# Patient Record
Sex: MALE | Race: WHITE | NOT HISPANIC OR LATINO | Employment: OTHER | URBAN - METROPOLITAN AREA
[De-identification: names, ages, dates, MRNs, and addresses within clinical notes are randomized per-mention and may not be internally consistent; named-entity substitution may affect disease eponyms.]

---

## 2017-01-11 DIAGNOSIS — E78.5 OTHER AND UNSPECIFIED HYPERLIPIDEMIA: Primary | ICD-10-CM

## 2017-01-11 RX ORDER — ATORVASTATIN CALCIUM 80 MG/1
80 TABLET, FILM COATED ORAL NIGHTLY
Qty: 90 TABLET | Refills: 3 | Status: SHIPPED | OUTPATIENT
Start: 2017-01-11 | End: 2017-02-07 | Stop reason: SDUPTHER

## 2017-01-11 NOTE — TELEPHONE ENCOUNTER
----- Message from Elle Denson sent at 1/11/2017  8:14 AM CST -----  Contact: pt  States he needs a refill on generic for lipator 80 mg, 90 day supply.  States it needs to go to [facility]. Please call pt at [phone]. Thank you

## 2017-02-07 DIAGNOSIS — I25.10 CORONARY ARTERY DISEASE, ANGINA PRESENCE UNSPECIFIED, UNSPECIFIED VESSEL OR LESION TYPE, UNSPECIFIED WHETHER NATIVE OR TRANSPLANTED HEART: ICD-10-CM

## 2017-02-07 DIAGNOSIS — E78.5 OTHER AND UNSPECIFIED HYPERLIPIDEMIA: ICD-10-CM

## 2017-02-07 DIAGNOSIS — I10 ESSENTIAL HYPERTENSION: ICD-10-CM

## 2017-02-07 RX ORDER — METOPROLOL TARTRATE 50 MG/1
50 TABLET ORAL 2 TIMES DAILY
Qty: 180 TABLET | Refills: 3 | Status: SHIPPED | OUTPATIENT
Start: 2017-02-07 | End: 2018-03-15 | Stop reason: SDUPTHER

## 2017-02-07 RX ORDER — ATORVASTATIN CALCIUM 80 MG/1
80 TABLET, FILM COATED ORAL NIGHTLY
Qty: 90 TABLET | Refills: 3 | Status: SHIPPED | OUTPATIENT
Start: 2017-02-07 | End: 2018-03-15 | Stop reason: SDUPTHER

## 2017-02-07 RX ORDER — CLOPIDOGREL BISULFATE 75 MG/1
75 TABLET ORAL DAILY
Qty: 90 TABLET | Refills: 3 | Status: SHIPPED | OUTPATIENT
Start: 2017-02-07 | End: 2018-03-15 | Stop reason: SDUPTHER

## 2017-02-07 RX ORDER — NIACIN 750 MG/1
750 TABLET, EXTENDED RELEASE ORAL 2 TIMES DAILY
Qty: 180 TABLET | Refills: 3 | Status: SHIPPED | OUTPATIENT
Start: 2017-02-07 | End: 2018-03-15 | Stop reason: SDUPTHER

## 2017-02-07 NOTE — TELEPHONE ENCOUNTER
----- Message from Earl Matos sent at 2/7/2017 10:26 AM CST -----  The pt is request that his rx be renewed through [facility] - [address] [address]  Phone: [phone] Fax: [phone]    atorvastatin (LIPITOR) 80 MG tablet  niacin (NIASPAN EXTENDED-RELEASE) 750 MG CR tablet (be sure to place rx to which the patient will receive the amount needed for 1500 MG)  clopidogrel (PLAVIX) 75 mg tablet

## 2017-07-11 ENCOUNTER — TELEPHONE (OUTPATIENT)
Dept: CARDIOLOGY | Facility: CLINIC | Age: 67
End: 2017-07-11

## 2017-07-11 NOTE — TELEPHONE ENCOUNTER
----- Message from Liberty Huntley sent at 7/11/2017  8:55 AM CDT -----  Contact: pt   States he's retn nurses call and can be reached at [phone] ext [phone]//thanks/[email or web address]

## 2017-07-12 NOTE — TELEPHONE ENCOUNTER
Spoke to patient's wife.  Informed her that we had called to get her scheduled for patient's 1 year follow up.  She stated patient will have to call office back after reviewing his work schedule.

## 2017-09-08 ENCOUNTER — OFFICE VISIT (OUTPATIENT)
Dept: CARDIOLOGY | Facility: CLINIC | Age: 67
End: 2017-09-08
Payer: COMMERCIAL

## 2017-09-08 VITALS
WEIGHT: 208.69 LBS | BODY MASS INDEX: 29.88 KG/M2 | HEART RATE: 73 BPM | DIASTOLIC BLOOD PRESSURE: 74 MMHG | HEIGHT: 70 IN | SYSTOLIC BLOOD PRESSURE: 120 MMHG

## 2017-09-08 DIAGNOSIS — I25.10 CORONARY ARTERY DISEASE INVOLVING NATIVE CORONARY ARTERY OF NATIVE HEART WITHOUT ANGINA PECTORIS: ICD-10-CM

## 2017-09-08 DIAGNOSIS — Z98.61 HISTORY OF PTCA: ICD-10-CM

## 2017-09-08 DIAGNOSIS — I25.2 OLD MI (MYOCARDIAL INFARCTION): ICD-10-CM

## 2017-09-08 DIAGNOSIS — I10 ESSENTIAL HYPERTENSION: Primary | ICD-10-CM

## 2017-09-08 DIAGNOSIS — E78.2 MIXED HYPERLIPIDEMIA: ICD-10-CM

## 2017-09-08 PROCEDURE — 3008F BODY MASS INDEX DOCD: CPT | Mod: S$GLB,,, | Performed by: INTERNAL MEDICINE

## 2017-09-08 PROCEDURE — 3078F DIAST BP <80 MM HG: CPT | Mod: S$GLB,,, | Performed by: INTERNAL MEDICINE

## 2017-09-08 PROCEDURE — 3074F SYST BP LT 130 MM HG: CPT | Mod: S$GLB,,, | Performed by: INTERNAL MEDICINE

## 2017-09-08 PROCEDURE — 99213 OFFICE O/P EST LOW 20 MIN: CPT | Mod: S$GLB,,, | Performed by: INTERNAL MEDICINE

## 2017-09-08 PROCEDURE — 93000 ELECTROCARDIOGRAM COMPLETE: CPT | Mod: S$GLB,,, | Performed by: INTERNAL MEDICINE

## 2017-09-08 PROCEDURE — 99999 PR PBB SHADOW E&M-EST. PATIENT-LVL III: CPT | Mod: PBBFAC,,, | Performed by: INTERNAL MEDICINE

## 2017-09-08 NOTE — PROGRESS NOTES
"Subjective:    Patient ID:  Harry Phan is a 66 y.o. male who presents for evaluation of Coronary Artery Disease (pt presents for a routine f/u) and Hypertension      HPI Mr. Phan returns for f/u.  His current medical conditions include CAD, dyslipidemia. Nonsmoker.   s/p NSTEMI 12/08 and was found to have a 100% chronic occluded mid-LCX artery and a 95% mid-RCA lesion (culprit vessel) and underwent successful PCI with a 4 x 23 mm Vision BMS. He is being medically managed for his occluded LCX. He also was noted to have nonobstructive LAD disease.   Now here for f/u.  Feels good. No anginal sxs.  ecg today is normal  BP controlled on current meds  Lipids stable on statin tx, PCP checking lipids.    Review of Systems   Constitution: Negative.   HENT: Negative.    Eyes: Negative.    Cardiovascular: Negative.    Respiratory: Negative.    Endocrine: Negative.    Hematologic/Lymphatic: Negative.    Skin: Negative.    Musculoskeletal: Negative.    Gastrointestinal: Negative.    Genitourinary: Negative.    Neurological: Negative.    Psychiatric/Behavioral: Negative.    Allergic/Immunologic: Negative.        /74   Pulse 73   Ht 5' 10" (1.778 m)   Wt 94.7 kg (208 lb 10.7 oz)   BMI 29.94 kg/m²     Wt Readings from Last 3 Encounters:   09/08/17 94.7 kg (208 lb 10.7 oz)   09/08/16 96.2 kg (212 lb 1.3 oz)   05/26/16 96.4 kg (212 lb 8.4 oz)     Temp Readings from Last 3 Encounters:   No data found for Temp     BP Readings from Last 3 Encounters:   09/08/17 120/74   09/08/16 134/78   05/26/16 (!) 142/94     Pulse Readings from Last 3 Encounters:   09/08/17 73   09/08/16 68   05/26/16 78          Objective:    Physical Exam   Constitutional: He is oriented to person, place, and time. He appears well-developed and well-nourished.   HENT:   Head: Normocephalic.   Neck: Normal range of motion. Neck supple. Normal carotid pulses, no hepatojugular reflux and no JVD present. Carotid bruit is not present. No thyromegaly " present.   Cardiovascular: Normal rate, regular rhythm, S1 normal and S2 normal.  PMI is not displaced.  Exam reveals no S3, no S4, no distant heart sounds, no friction rub, no midsystolic click and no opening snap.    No murmur heard.  Pulses:       Radial pulses are 2+ on the right side, and 2+ on the left side.   Pulmonary/Chest: Effort normal and breath sounds normal. He has no wheezes. He has no rales.   Abdominal: Soft. Bowel sounds are normal. He exhibits no distension, no abdominal bruit, no ascites and no mass. There is no tenderness.   Musculoskeletal: He exhibits no edema.   Neurological: He is alert and oriented to person, place, and time.   Skin: Skin is warm.   Psychiatric: He has a normal mood and affect. His behavior is normal.   Nursing note and vitals reviewed.    I have reviewed all pertinent labs and cardiac studies.      Chemistry        Component Value Date/Time     04/25/2016 0829    K 4.4 04/25/2016 0829     04/25/2016 0829    CO2 26 04/25/2016 0829    BUN 14 04/25/2016 0829    CREATININE 0.9 04/25/2016 0829    GLU 93 04/25/2016 0829        Component Value Date/Time    CALCIUM 9.2 04/25/2016 0829    ALKPHOS 68 04/25/2016 0829    AST 25 04/25/2016 0829    ALT 20 04/25/2016 0829    BILITOT 0.7 04/25/2016 0829    ESTGFRAFRICA >60.0 04/25/2016 0829    EGFRNONAA >60.0 04/25/2016 0829        Lab Results   Component Value Date    WBC 6.66 02/04/2010    HGB 14.4 02/04/2010    HCT 43.0 02/04/2010    MCV 89.0 02/04/2010     02/04/2010     Lab Results   Component Value Date    CHOL 117 (L) 04/25/2016    CHOL 119 (L) 04/02/2015    CHOL 121 04/11/2014     Lab Results   Component Value Date    HDL 33 (L) 04/25/2016    HDL 36 (L) 04/02/2015    HDL 34 (L) 04/11/2014     Lab Results   Component Value Date    LDLCALC 69.4 04/25/2016    LDLCALC 68.6 04/02/2015    LDLCALC 73.2 04/11/2014     Lab Results   Component Value Date    TRIG 73 04/25/2016    TRIG 72 04/02/2015    TRIG 69 04/11/2014      Lab Results   Component Value Date    CHOLHDL 28.2 04/25/2016    CHOLHDL 30.3 04/02/2015    CHOLHDL 28.1 04/11/2014           Assessment:       Stable CAD on current medical tx.    1. Essential hypertension    2. Old MI (myocardial infarction)    3. Mixed hyperlipidemia    4. History of PTCA    5. Coronary artery disease involving native coronary artery of native heart without angina pectoris         Plan:             Continue current medical tx.  Cardiac diet  Daily exercise  F/u 1 year with stress echo.

## 2018-03-15 DIAGNOSIS — I10 ESSENTIAL HYPERTENSION: ICD-10-CM

## 2018-03-15 DIAGNOSIS — E78.2 MIXED HYPERLIPIDEMIA: ICD-10-CM

## 2018-03-15 DIAGNOSIS — I25.10 CORONARY ARTERY DISEASE, ANGINA PRESENCE UNSPECIFIED, UNSPECIFIED VESSEL OR LESION TYPE, UNSPECIFIED WHETHER NATIVE OR TRANSPLANTED HEART: ICD-10-CM

## 2018-03-15 RX ORDER — CLOPIDOGREL BISULFATE 75 MG/1
75 TABLET ORAL DAILY
Qty: 90 TABLET | Refills: 3 | Status: ON HOLD | OUTPATIENT
Start: 2018-03-15 | End: 2018-08-03 | Stop reason: HOSPADM

## 2018-03-15 RX ORDER — METOPROLOL TARTRATE 50 MG/1
50 TABLET ORAL 2 TIMES DAILY
Qty: 180 TABLET | Refills: 3 | Status: SHIPPED | OUTPATIENT
Start: 2018-03-15 | End: 2019-01-01 | Stop reason: SDUPTHER

## 2018-03-15 RX ORDER — NIACIN 750 MG/1
750 TABLET, EXTENDED RELEASE ORAL 2 TIMES DAILY
Qty: 180 TABLET | Refills: 3 | Status: SHIPPED | OUTPATIENT
Start: 2018-03-15

## 2018-03-15 RX ORDER — ATORVASTATIN CALCIUM 80 MG/1
80 TABLET, FILM COATED ORAL NIGHTLY
Qty: 90 TABLET | Refills: 3 | Status: SHIPPED | OUTPATIENT
Start: 2018-03-15 | End: 2019-01-01 | Stop reason: SDUPTHER

## 2018-08-01 ENCOUNTER — CLINICAL SUPPORT (OUTPATIENT)
Dept: CARDIOLOGY | Facility: CLINIC | Age: 68
End: 2018-08-01
Payer: MEDICARE

## 2018-08-01 ENCOUNTER — OFFICE VISIT (OUTPATIENT)
Dept: CARDIOLOGY | Facility: CLINIC | Age: 68
End: 2018-08-01
Payer: MEDICARE

## 2018-08-01 VITALS
HEIGHT: 70 IN | HEART RATE: 68 BPM | DIASTOLIC BLOOD PRESSURE: 72 MMHG | SYSTOLIC BLOOD PRESSURE: 140 MMHG | BODY MASS INDEX: 30.87 KG/M2 | WEIGHT: 215.63 LBS

## 2018-08-01 DIAGNOSIS — I25.10 CAD (CORONARY ARTERY DISEASE): Primary | ICD-10-CM

## 2018-08-01 DIAGNOSIS — I20.9 AP (ANGINA PECTORIS): ICD-10-CM

## 2018-08-01 DIAGNOSIS — I10 ESSENTIAL HYPERTENSION: ICD-10-CM

## 2018-08-01 DIAGNOSIS — I25.10 CORONARY ARTERY DISEASE INVOLVING NATIVE CORONARY ARTERY OF NATIVE HEART WITHOUT ANGINA PECTORIS: ICD-10-CM

## 2018-08-01 DIAGNOSIS — I20.0 ANGINA PECTORIS, UNSTABLE: Primary | ICD-10-CM

## 2018-08-01 DIAGNOSIS — Z98.61 HISTORY OF PTCA: ICD-10-CM

## 2018-08-01 DIAGNOSIS — I25.2 OLD MI (MYOCARDIAL INFARCTION): ICD-10-CM

## 2018-08-01 DIAGNOSIS — E78.2 MIXED HYPERLIPIDEMIA: ICD-10-CM

## 2018-08-01 PROCEDURE — 93000 ELECTROCARDIOGRAM COMPLETE: CPT | Mod: S$GLB,,, | Performed by: INTERNAL MEDICINE

## 2018-08-01 PROCEDURE — 3077F SYST BP >= 140 MM HG: CPT | Mod: CPTII,S$GLB,, | Performed by: INTERNAL MEDICINE

## 2018-08-01 PROCEDURE — 99215 OFFICE O/P EST HI 40 MIN: CPT | Mod: S$GLB,,, | Performed by: INTERNAL MEDICINE

## 2018-08-01 PROCEDURE — 99999 PR PBB SHADOW E&M-EST. PATIENT-LVL III: CPT | Mod: PBBFAC,,, | Performed by: INTERNAL MEDICINE

## 2018-08-01 PROCEDURE — 3008F BODY MASS INDEX DOCD: CPT | Mod: CPTII,S$GLB,, | Performed by: INTERNAL MEDICINE

## 2018-08-01 PROCEDURE — 3078F DIAST BP <80 MM HG: CPT | Mod: CPTII,S$GLB,, | Performed by: INTERNAL MEDICINE

## 2018-08-01 RX ORDER — ISOSORBIDE MONONITRATE 30 MG/1
30 TABLET, EXTENDED RELEASE ORAL DAILY
Qty: 30 TABLET | Refills: 11 | Status: SHIPPED | OUTPATIENT
Start: 2018-08-01 | End: 2018-09-05 | Stop reason: SDUPTHER

## 2018-08-01 RX ORDER — RAMIPRIL 1.25 MG/1
CAPSULE ORAL DAILY
COMMUNITY
Start: 2018-07-02 | End: 2018-11-09 | Stop reason: ALTCHOICE

## 2018-08-01 RX ORDER — NITROGLYCERIN 0.4 MG/1
0.4 TABLET SUBLINGUAL EVERY 5 MIN PRN
Qty: 20 TABLET | Refills: 12 | Status: SHIPPED | OUTPATIENT
Start: 2018-08-01 | End: 2020-02-19 | Stop reason: SDUPTHER

## 2018-08-01 NOTE — PROGRESS NOTES
Subjective:    Patient ID:  Harry Phan is a 67 y.o. male who presents for evaluation of Chest Pain; Coronary Artery Disease; Hyperlipidemia; and Hypertension      HPI Mr. Phan returns for f/u.  His current medical conditions include CAD, dyslipidemia. Nonsmoker.   s/p NSTEMI 12/08 and was found to have a 100% chronic occluded mid-LCX artery and a 95% mid-RCA lesion (culprit vessel) and underwent successful PCI with a 4 x 23 mm Vision BMS. He is being medically managed for his occluded LCX. He also was noted to have nonobstructive LAD disease.   Now here for f/u.  A week ago, while outside picking up limbs had chest pressure type sensation upper chest.  Had few episodes with exertion, lasted 2 - 3 minutes and resolve.  No associated sxs.  sxs occurred outside.  Last episode was few days ago shoveling dirt.  No cp with ordinary walking.   He did have episode with light running.  sxs resolve quickly with rest.  No rest pain.  Had some belching, indigestion sometimes in past, few weeks ago.  ecg today is normal.          Current Outpatient Prescriptions:     aspirin (ECOTRIN) 81 MG EC tablet, Take 81 mg by mouth once daily., Disp: , Rfl:     atorvastatin (LIPITOR) 80 MG tablet, Take 1 tablet (80 mg total) by mouth every evening., Disp: 90 tablet, Rfl: 3    clopidogrel (PLAVIX) 75 mg tablet, Take 1 tablet (75 mg total) by mouth once daily., Disp: 90 tablet, Rfl: 3    metoprolol tartrate (LOPRESSOR) 50 MG tablet, Take 1 tablet (50 mg total) by mouth 2 (two) times daily., Disp: 180 tablet, Rfl: 3    niacin (NIASPAN EXTENDED-RELEASE) 750 MG CR tablet, Take 1 tablet (750 mg total) by mouth 2 (two) times daily., Disp: 180 tablet, Rfl: 3    ramipril (ALTACE) 1.25 MG capsule, , Disp: , Rfl:     isosorbide mononitrate (IMDUR) 30 MG 24 hr tablet, Take 1 tablet (30 mg total) by mouth once daily., Disp: 30 tablet, Rfl: 11    nitroGLYCERIN (NITROSTAT) 0.4 MG SL tablet, Place 1 tablet (0.4 mg total) under the tongue  "every 5 (five) minutes as needed., Disp: 20 tablet, Rfl: 12      Review of Systems   Constitution: Negative.   HENT: Negative.    Eyes: Negative.    Cardiovascular: Positive for chest pain.   Respiratory: Negative.    Endocrine: Negative.    Hematologic/Lymphatic: Negative.    Skin: Negative.    Musculoskeletal: Negative.    Gastrointestinal: Positive for heartburn.   Genitourinary: Negative.    Neurological: Negative.    Psychiatric/Behavioral: Negative.    Allergic/Immunologic: Negative.        BP (!) 140/72 (BP Location: Right arm, Patient Position: Sitting, BP Method: Medium (Manual))   Pulse 68   Ht 5' 10" (1.778 m)   Wt 97.8 kg (215 lb 9.8 oz)   BMI 30.94 kg/m²     Wt Readings from Last 3 Encounters:   08/01/18 97.8 kg (215 lb 9.8 oz)   09/08/17 94.7 kg (208 lb 10.7 oz)   09/08/16 96.2 kg (212 lb 1.3 oz)     Temp Readings from Last 3 Encounters:   No data found for Temp     BP Readings from Last 3 Encounters:   08/01/18 (!) 140/72   09/08/17 120/74   09/08/16 134/78     Pulse Readings from Last 3 Encounters:   08/01/18 68   09/08/17 73   09/08/16 68          Objective:    Physical Exam   Constitutional: He is oriented to person, place, and time. He appears well-developed and well-nourished.   HENT:   Head: Normocephalic.   Neck: Normal range of motion. Neck supple. Normal carotid pulses, no hepatojugular reflux and no JVD present. Carotid bruit is not present. No thyromegaly present.   Cardiovascular: Normal rate, regular rhythm, S1 normal and S2 normal.  PMI is not displaced.  Exam reveals no S3, no S4, no distant heart sounds, no friction rub, no midsystolic click and no opening snap.    No murmur heard.  Pulses:       Radial pulses are 2+ on the right side, and 2+ on the left side.   Pulmonary/Chest: Effort normal and breath sounds normal. He has no wheezes. He has no rales.   Abdominal: Soft. Bowel sounds are normal. He exhibits no distension, no abdominal bruit, no ascites and no mass. There is no " tenderness.   Musculoskeletal: He exhibits no edema.   Neurological: He is alert and oriented to person, place, and time.   Skin: Skin is warm.   Psychiatric: He has a normal mood and affect. His behavior is normal.   Nursing note and vitals reviewed.      I have reviewed all pertinent labs and cardiac studies.          Assessment:       1. Angina pectoris, unstable    2. Essential hypertension    3. Old MI (myocardial infarction)    4. Mixed hyperlipidemia    5. History of PTCA    6. Coronary artery disease involving native coronary artery of native heart without angina pectoris    7. AP (angina pectoris)         Plan:             Exertional angina, classic sxs.  New onset -- has not had angina since his PCI for NSTEMI 2008.  Dx with unstable angina.  Imdur 30 mg qd.  Sl ntg prn. Pt advised how to use and when to go to ER.   Continue other meds.  Long discussion about stress testing vs LHC, pros/cons of each approach.  Recommend LHC in light of new onset angina to reassess patency of his RCA stent,  LCX and look for progression of disease.  Pt and wife agreeable to proceeding.  Will schedule asap in am.  To ER if any sxs worsen in meantime.    Pt advised to undergo left heart catheterization with possible PCI if warranted.  Pt advised of all risks and benefits of procedure.  Pt advised of alternative approaches and treatment strategies to include medical therapy, PCI as well as surgical revascularization with possible CABG.  All questions answered in clinic.     Right radial approach.

## 2018-08-02 ENCOUNTER — SURGERY (OUTPATIENT)
Age: 68
End: 2018-08-02

## 2018-08-02 ENCOUNTER — HOSPITAL ENCOUNTER (OUTPATIENT)
Facility: HOSPITAL | Age: 68
Discharge: HOME | End: 2018-08-03
Attending: INTERNAL MEDICINE | Admitting: INTERNAL MEDICINE
Payer: MEDICARE

## 2018-08-02 DIAGNOSIS — I25.10 ATHEROSCLEROTIC HEART DISEASE OF NATIVE CORONARY ARTERY WITHOUT ANGINA PECTORIS: ICD-10-CM

## 2018-08-02 DIAGNOSIS — I20.0 UNSTABLE ANGINA: ICD-10-CM

## 2018-08-02 DIAGNOSIS — Z95.5 STATUS POST INSERTION OF DRUG-ELUTING STENT INTO LEFT ANTERIOR DESCENDING (LAD) ARTERY: Primary | ICD-10-CM

## 2018-08-02 LAB
ANION GAP SERPL CALC-SCNC: 7 MMOL/L
APTT BLDCRRT: 25.2 SEC
BUN SERPL-MCNC: 15 MG/DL
CALCIUM SERPL-MCNC: 9.6 MG/DL
CHLORIDE SERPL-SCNC: 109 MMOL/L
CO2 SERPL-SCNC: 25 MMOL/L
CORONARY STENOSIS: ABNORMAL
CORONARY STENT: YES
CREAT SERPL-MCNC: 1 MG/DL
ERYTHROCYTE [DISTWIDTH] IN BLOOD BY AUTOMATED COUNT: 13.8 %
EST. GFR  (AFRICAN AMERICAN): >60 ML/MIN/1.73 M^2
EST. GFR  (NON AFRICAN AMERICAN): >60 ML/MIN/1.73 M^2
GLUCOSE SERPL-MCNC: 102 MG/DL
HCT VFR BLD AUTO: 47 %
HGB BLD-MCNC: 16.2 G/DL
INR PPP: 1.1
MCH RBC QN AUTO: 31.4 PG
MCHC RBC AUTO-ENTMCNC: 34.5 G/DL
MCV RBC AUTO: 91 FL
PLATELET # BLD AUTO: 129 K/UL
PMV BLD AUTO: 10.2 FL
POTASSIUM SERPL-SCNC: 4.1 MMOL/L
PROTHROMBIN TIME: 11.2 SEC
RBC # BLD AUTO: 5.16 M/UL
SODIUM SERPL-SCNC: 141 MMOL/L
WBC # BLD AUTO: 7.17 K/UL

## 2018-08-02 PROCEDURE — 93458 L HRT ARTERY/VENTRICLE ANGIO: CPT | Mod: 59

## 2018-08-02 PROCEDURE — 85610 PROTHROMBIN TIME: CPT

## 2018-08-02 PROCEDURE — 93010 ELECTROCARDIOGRAM REPORT: CPT | Mod: 59,,, | Performed by: INTERNAL MEDICINE

## 2018-08-02 PROCEDURE — 80048 BASIC METABOLIC PNL TOTAL CA: CPT

## 2018-08-02 PROCEDURE — 93005 ELECTROCARDIOGRAM TRACING: CPT

## 2018-08-02 PROCEDURE — 85027 COMPLETE CBC AUTOMATED: CPT

## 2018-08-02 PROCEDURE — 85730 THROMBOPLASTIN TIME PARTIAL: CPT

## 2018-08-02 PROCEDURE — C9600 PERC DRUG-EL COR STENT SING: HCPCS | Mod: LD

## 2018-08-02 PROCEDURE — 25000003 PHARM REV CODE 250

## 2018-08-02 PROCEDURE — 63600175 PHARM REV CODE 636 W HCPCS

## 2018-08-02 PROCEDURE — 25000003 PHARM REV CODE 250: Performed by: INTERNAL MEDICINE

## 2018-08-02 PROCEDURE — 99152 MOD SED SAME PHYS/QHP 5/>YRS: CPT | Mod: ,,, | Performed by: INTERNAL MEDICINE

## 2018-08-02 PROCEDURE — 92928 PRQ TCAT PLMT NTRAC ST 1 LES: CPT | Mod: LD,,, | Performed by: INTERNAL MEDICINE

## 2018-08-02 PROCEDURE — 93458 L HRT ARTERY/VENTRICLE ANGIO: CPT | Mod: 26,59,, | Performed by: INTERNAL MEDICINE

## 2018-08-02 RX ORDER — NITROGLYCERIN 0.4 MG/1
0.4 TABLET SUBLINGUAL EVERY 5 MIN PRN
Status: DISCONTINUED | OUTPATIENT
Start: 2018-08-02 | End: 2018-08-03 | Stop reason: HOSPADM

## 2018-08-02 RX ORDER — SODIUM CHLORIDE 9 MG/ML
INJECTION, SOLUTION INTRAVENOUS CONTINUOUS
Status: DISCONTINUED | OUTPATIENT
Start: 2018-08-02 | End: 2018-08-03 | Stop reason: HOSPADM

## 2018-08-02 RX ORDER — METOPROLOL TARTRATE 50 MG/1
50 TABLET ORAL 2 TIMES DAILY
Status: DISCONTINUED | OUTPATIENT
Start: 2018-08-02 | End: 2018-08-03 | Stop reason: HOSPADM

## 2018-08-02 RX ORDER — ZOLPIDEM TARTRATE 5 MG/1
5 TABLET ORAL NIGHTLY PRN
Status: DISCONTINUED | OUTPATIENT
Start: 2018-08-02 | End: 2018-08-03 | Stop reason: HOSPADM

## 2018-08-02 RX ORDER — HYDRALAZINE HYDROCHLORIDE 20 MG/ML
10 INJECTION INTRAMUSCULAR; INTRAVENOUS EVERY 4 HOURS PRN
Status: DISCONTINUED | OUTPATIENT
Start: 2018-08-02 | End: 2018-08-02

## 2018-08-02 RX ORDER — ATORVASTATIN CALCIUM 40 MG/1
80 TABLET, FILM COATED ORAL NIGHTLY
Status: DISCONTINUED | OUTPATIENT
Start: 2018-08-02 | End: 2018-08-03 | Stop reason: HOSPADM

## 2018-08-02 RX ORDER — DIPHENHYDRAMINE HCL 50 MG
50 CAPSULE ORAL ONCE
Status: COMPLETED | OUTPATIENT
Start: 2018-08-02 | End: 2018-08-02

## 2018-08-02 RX ORDER — ASPIRIN 325 MG
325 TABLET ORAL ONCE
Status: COMPLETED | OUTPATIENT
Start: 2018-08-02 | End: 2018-08-02

## 2018-08-02 RX ORDER — ONDANSETRON 8 MG/1
8 TABLET, ORALLY DISINTEGRATING ORAL EVERY 8 HOURS PRN
Status: DISCONTINUED | OUTPATIENT
Start: 2018-08-02 | End: 2018-08-03 | Stop reason: HOSPADM

## 2018-08-02 RX ORDER — DIAZEPAM 5 MG/1
5 TABLET ORAL
Status: COMPLETED | OUTPATIENT
Start: 2018-08-02 | End: 2018-08-02

## 2018-08-02 RX ORDER — RAMIPRIL 1.25 MG/1
1.25 CAPSULE ORAL DAILY
Status: DISCONTINUED | OUTPATIENT
Start: 2018-08-03 | End: 2018-08-03 | Stop reason: HOSPADM

## 2018-08-02 RX ORDER — HYDROCODONE BITARTRATE AND ACETAMINOPHEN 5; 325 MG/1; MG/1
1 TABLET ORAL EVERY 4 HOURS PRN
Status: DISCONTINUED | OUTPATIENT
Start: 2018-08-02 | End: 2018-08-03 | Stop reason: HOSPADM

## 2018-08-02 RX ORDER — PRASUGREL 10 MG/1
10 TABLET, FILM COATED ORAL DAILY
Status: DISCONTINUED | OUTPATIENT
Start: 2018-08-03 | End: 2018-08-03 | Stop reason: HOSPADM

## 2018-08-02 RX ORDER — SODIUM CHLORIDE 9 MG/ML
INJECTION, SOLUTION INTRAVENOUS CONTINUOUS
Status: DISCONTINUED | OUTPATIENT
Start: 2018-08-02 | End: 2018-08-02

## 2018-08-02 RX ORDER — MORPHINE SULFATE 10 MG/ML
2 INJECTION INTRAMUSCULAR; INTRAVENOUS; SUBCUTANEOUS EVERY 4 HOURS PRN
Status: DISCONTINUED | OUTPATIENT
Start: 2018-08-02 | End: 2018-08-03 | Stop reason: HOSPADM

## 2018-08-02 RX ORDER — ASPIRIN 81 MG/1
81 TABLET ORAL DAILY
Status: DISCONTINUED | OUTPATIENT
Start: 2018-08-03 | End: 2018-08-03 | Stop reason: HOSPADM

## 2018-08-02 RX ADMIN — DIAZEPAM 5 MG: 5 TABLET ORAL at 07:08

## 2018-08-02 RX ADMIN — Medication 50 MG: at 07:08

## 2018-08-02 RX ADMIN — Medication 325 MG: at 07:08

## 2018-08-02 RX ADMIN — ATORVASTATIN CALCIUM 80 MG: 40 TABLET, FILM COATED ORAL at 08:08

## 2018-08-02 RX ADMIN — SODIUM CHLORIDE: 0.9 INJECTION, SOLUTION INTRAVENOUS at 01:08

## 2018-08-02 RX ADMIN — METOPROLOL TARTRATE 50 MG: 50 TABLET ORAL at 08:08

## 2018-08-02 RX ADMIN — SODIUM CHLORIDE: 0.9 INJECTION, SOLUTION INTRAVENOUS at 06:08

## 2018-08-02 RX ADMIN — SODIUM CHLORIDE: 9 INJECTION, SOLUTION INTRAVENOUS at 07:08

## 2018-08-02 NOTE — PLAN OF CARE
CM met with patient and wife at bedside to assess discharge needs. Patient lives at home with wife and is independent with needs. Patient ambulates safely independently and denies any recent falls. No dc needs identified at this time. CM assessment and MOON completed, placed in chart, and copy given to patient.        08/02/18 1610   Discharge Assessment   Assessment Type Discharge Planning Assessment   Confirmed/corrected address and phone number on facesheet? Yes   Assessment information obtained from? Patient;Caregiver   Prior to hospitilization cognitive status: Alert/Oriented   Prior to hospitalization functional status: Independent   Current cognitive status: Alert/Oriented   Current Functional Status: Independent   Lives With spouse   Able to Return to Prior Arrangements yes   Is patient able to care for self after discharge? Yes   Patient's perception of discharge disposition home or selfcare   Readmission Within The Last 30 Days no previous admission in last 30 days   Patient currently being followed by outpatient case management? No   Patient currently receives any other outside agency services? No   Equipment Currently Used at Home none   Do you have any problems affording any of your prescribed medications? No   Is the patient taking medications as prescribed? yes   Does the patient have transportation home? Yes   Transportation Available family or friend will provide   Does the patient receive services at the [facility]? No   Discharge Plan A Home   Patient/Family In Agreement With Plan yes

## 2018-08-02 NOTE — OP NOTE
[facility] -   Cardiac Catheterization  Procedure Note    SUMMARY     Harry Phan  [id number]  Jesse Watkins MD    Date of Procedure: 8/2/2018    Procedure:  1.  LHC 2. LEFT VENTRICULOGRAM  3. CORONARY ANGIOGRAM  4.  PCI 95% PROX LAD MELE X ONE    Provider: Hernandez Sen MD    Assisting Provider:  Raul Izaguirre    Indications: He was referred for cardiac catheterization to further evaluate unstable angina.    Pre-Procedure Diagnosis:  Unstable angina    Post-Procedure Diagnosis:  PCI prox LAD MELE x one    Anesthesia: RN IV Sedation    Description of the Findings of the Procedure:     The risks, benefits, complications, treatment options, and expected outcomes were discussed with the patient. The patient and/or family concurred with the proposed plan, giving informed consent. Patient was brought to the cath lab after IV hydration was begun and oral premedication was given.    Findings:    PCI 95% prox LAD Stent Resolute MELE x one   LCX  Patent RCA stent  Normal EF  Right radial TR band  See report    Complications: None; patient tolerated the procedure well.    Estimated Blood Loss (EBL): Minimal           Implants: MELE x one    Specimens: none    Condition: stable    Disposition: PACU - hemodynamically stable.    Attestation: I was present and scrubbed for the entire procedure.     Recommendations:    Usual post PCI care  Asa  effient  Maximize medical tx for CAD  Cardiac diet  Admit overnight for observation

## 2018-08-02 NOTE — CARE UPDATE
Pt admitted to floor. Alert and oriented x 3. Cooperative with care. Denies chest pain. Heart with rrr. Pulses palpable. Rt wrist puncture site wnl. No swelling. No signs of hematoma. Wrist brace in place. Lungs wnl. Iv site wnl. Oriented to room and call light. No distress noted. Wife at bedside.

## 2018-08-02 NOTE — NURSING TRANSFER
Nursing Transfer Note      8/2/2018     [id number]    Transfer via W/C    Transfer with BELONGINGS AND PORT MONITOR    Transported by JANIYA NUÑEZ RN    Medicines sent: NONE    Chart send with patient: YES    Notified: WIFE AT BEDSIDE     Patient reassessed at: 8/2/18   1245  BEDSIDE REPORT TO DENISE    Upon arrival to floor: TRANSFERRED TO ROOM. TRANSFERRED TO BED.  TELEMETRY MONITER ON.  IV FLUIDS ON PUMP AT PRESCRIBED RATE.  PROCEDURAL ACCESS SITE WITHOUT BLEEDING OR HEMATOMA.  DRESSING DRY AND INTACT.  CARE HANDED OFF TO...DENISE.......

## 2018-08-02 NOTE — PLAN OF CARE
Problem: Patient Care Overview  Goal: Plan of Care Review  Outcome: Ongoing (interventions implemented as appropriate)  Dx chest pain( heart cath)  poc instructed on dbc 10 x q1h wa. Instructed on turning q2h. No signs  Of hematoma. Site wnl. Area soft to touch. Pulses palpable. Oriented to room and call light. Water, phone and call light in reach. Denies pain. No falls. Wife at bedside.

## 2018-08-02 NOTE — INTERVAL H&P NOTE
The patient has been examined and the H&P has been reviewed:    I concur with the findings and no changes have occurred since H&P was written.    Anesthesia/Surgery risks, benefits and alternative options discussed and understood by patient/family.          Active Hospital Problems    Diagnosis  POA    Unstable angina [I20.0]  Yes     Priority: High      Resolved Hospital Problems    Diagnosis Date Resolved POA   No resolved problems to display.

## 2018-08-02 NOTE — PLAN OF CARE
08/02/18 1629   MUHAMMAD Message   Medicare Outpatient and Observation Notification regarding financial responsibility Given to patient/caregiver;Explained to patient/caregiver;Signed/date by patient/caregiver   Date MUHAMMAD was signed 08/02/18   Time MUHAMMAD was signed 1612

## 2018-08-03 VITALS
WEIGHT: 215.38 LBS | BODY MASS INDEX: 30.83 KG/M2 | HEIGHT: 70 IN | SYSTOLIC BLOOD PRESSURE: 137 MMHG | HEART RATE: 67 BPM | TEMPERATURE: 97 F | DIASTOLIC BLOOD PRESSURE: 77 MMHG | OXYGEN SATURATION: 96 % | RESPIRATION RATE: 20 BRPM

## 2018-08-03 PROBLEM — Z95.5 STATUS POST INSERTION OF DRUG-ELUTING STENT INTO LEFT ANTERIOR DESCENDING (LAD) ARTERY: Status: ACTIVE | Noted: 2018-08-03

## 2018-08-03 LAB
ALBUMIN SERPL BCP-MCNC: 3.2 G/DL
ALP SERPL-CCNC: 64 U/L
ALT SERPL W/O P-5'-P-CCNC: 19 U/L
ANION GAP SERPL CALC-SCNC: 4 MMOL/L
AST SERPL-CCNC: 22 U/L
BASOPHILS # BLD AUTO: 0.01 K/UL
BASOPHILS NFR BLD: 0.1 %
BILIRUB SERPL-MCNC: 0.9 MG/DL
BUN SERPL-MCNC: 13 MG/DL
CALCIUM SERPL-MCNC: 8.9 MG/DL
CHLORIDE SERPL-SCNC: 109 MMOL/L
CO2 SERPL-SCNC: 26 MMOL/L
CREAT SERPL-MCNC: 0.8 MG/DL
DIFFERENTIAL METHOD: ABNORMAL
EOSINOPHIL # BLD AUTO: 0.1 K/UL
EOSINOPHIL NFR BLD: 1.7 %
ERYTHROCYTE [DISTWIDTH] IN BLOOD BY AUTOMATED COUNT: 13.9 %
EST. GFR  (AFRICAN AMERICAN): >60 ML/MIN/1.73 M^2
EST. GFR  (NON AFRICAN AMERICAN): >60 ML/MIN/1.73 M^2
GLUCOSE SERPL-MCNC: 92 MG/DL
HCT VFR BLD AUTO: 40.8 %
HGB BLD-MCNC: 13.6 G/DL
LYMPHOCYTES # BLD AUTO: 1.4 K/UL
LYMPHOCYTES NFR BLD: 19.7 %
MCH RBC QN AUTO: 30.6 PG
MCHC RBC AUTO-ENTMCNC: 33.3 G/DL
MCV RBC AUTO: 92 FL
MONOCYTES # BLD AUTO: 0.6 K/UL
MONOCYTES NFR BLD: 9.2 %
NEUTROPHILS # BLD AUTO: 4.8 K/UL
NEUTROPHILS NFR BLD: 69.3 %
PLATELET # BLD AUTO: 107 K/UL
PMV BLD AUTO: 10.2 FL
POTASSIUM SERPL-SCNC: 4.1 MMOL/L
PROT SERPL-MCNC: 5.5 G/DL
RBC # BLD AUTO: 4.45 M/UL
SODIUM SERPL-SCNC: 139 MMOL/L
WBC # BLD AUTO: 6.94 K/UL

## 2018-08-03 PROCEDURE — 99900031 HC PATIENT EDUCATION (STAT)

## 2018-08-03 PROCEDURE — 85025 COMPLETE CBC W/AUTO DIFF WBC: CPT

## 2018-08-03 PROCEDURE — 36415 COLL VENOUS BLD VENIPUNCTURE: CPT

## 2018-08-03 PROCEDURE — 93005 ELECTROCARDIOGRAM TRACING: CPT

## 2018-08-03 PROCEDURE — 25000003 PHARM REV CODE 250: Performed by: INTERNAL MEDICINE

## 2018-08-03 PROCEDURE — 93010 ELECTROCARDIOGRAM REPORT: CPT | Mod: ,,, | Performed by: INTERNAL MEDICINE

## 2018-08-03 PROCEDURE — 80053 COMPREHEN METABOLIC PANEL: CPT

## 2018-08-03 RX ORDER — PRASUGREL 10 MG/1
10 TABLET, FILM COATED ORAL DAILY
Qty: 30 TABLET | Refills: 11 | Status: SHIPPED | OUTPATIENT
Start: 2018-08-04 | End: 2018-08-09 | Stop reason: SDUPTHER

## 2018-08-03 RX ADMIN — SODIUM CHLORIDE: 0.9 INJECTION, SOLUTION INTRAVENOUS at 07:08

## 2018-08-03 RX ADMIN — PRASUGREL 10 MG: 10 TABLET, FILM COATED ORAL at 08:08

## 2018-08-03 RX ADMIN — METOPROLOL TARTRATE 50 MG: 50 TABLET ORAL at 08:08

## 2018-08-03 RX ADMIN — RAMIPRIL 1.25 MG: 1.25 CAPSULE ORAL at 08:08

## 2018-08-03 RX ADMIN — ASPIRIN 81 MG: 81 TABLET, COATED ORAL at 08:08

## 2018-08-03 NOTE — PLAN OF CARE
Problem: Patient Care Overview  Goal: Plan of Care Review  Outcome: Ongoing (interventions implemented as appropriate)  Pt alert and oriented. POC reviewed. IV fluids administered per orders. Dressing to right wrist CDI. Immobilizer in place. Pt remained free of falls during shift. Bed alarm set , call light in reach, room free of clutter, side rails  up x2, pt on telemetry monitor, will continue to monitor.

## 2018-08-03 NOTE — DISCHARGE SUMMARY
Discharge Note  Short Stay      SUMMARY     Admit Date: 8/2/2018    Attending Physician: Hernandez Sen MD     Discharge Physician: [facility]     Discharge Date: 8/3/18     Final Diagnosis: CAD     Outcome of Stay:  Mr. Phan presented for OP cath to further evaluate unstable angina symptoms. Cath showed 95% prox LAD lesion with successful PCI using MELE x1. Also noted to have  LCX with collateral from RCA and patent RCA stent. LVEF normal . Had uneventful night. Denies any chest pain or angina equivalent. No symptoms with ambulation. Tolerating meds well. Right radial access site without redness, tenderness, swelling, or drainage. There is no active external bleeding or hematoma. Right Radial and brachial pulses 2+. Skin warm/dry/pink with normal capillary refill. Has been examined and is clear for discharge. Has been counseled on post angiogram restrictions and verbalized an understanding. He will follow up in clinic next week. Clinic will call to arrange appt.     Disposition: Home or Self Care    Patient Instructions:   Current Discharge Medication List      START taking these medications    Details   prasugrel (EFFIENT) 10 mg Tab Take 1 tablet (10 mg total) by mouth once daily.  Qty: 30 tablet, Refills: 11         CONTINUE these medications which have NOT CHANGED    Details   aspirin (ECOTRIN) 81 MG EC tablet Take 81 mg by mouth once daily.      atorvastatin (LIPITOR) 80 MG tablet Take 1 tablet (80 mg total) by mouth every evening.  Qty: 90 tablet, Refills: 3    Associated Diagnoses: Mixed hyperlipidemia; Coronary artery disease, angina presence unspecified, unspecified vessel or lesion type, unspecified whether native or transplanted heart      isosorbide mononitrate (IMDUR) 30 MG 24 hr tablet Take 1 tablet (30 mg total) by mouth once daily.  Qty: 30 tablet, Refills: 11    Associated Diagnoses: AP (angina pectoris)      metoprolol tartrate (LOPRESSOR) 50 MG tablet Take 1 tablet (50 mg  total) by mouth 2 (two) times daily.  Qty: 180 tablet, Refills: 3    Associated Diagnoses: Essential hypertension; Coronary artery disease, angina presence unspecified, unspecified vessel or lesion type, unspecified whether native or transplanted heart      niacin (NIASPAN EXTENDED-RELEASE) 750 MG CR tablet Take 1 tablet (750 mg total) by mouth 2 (two) times daily.  Qty: 180 tablet, Refills: 3    Associated Diagnoses: Coronary artery disease, angina presence unspecified, unspecified vessel or lesion type, unspecified whether native or transplanted heart      ramipril (ALTACE) 1.25 MG capsule once daily.       nitroGLYCERIN (NITROSTAT) 0.4 MG SL tablet Place 1 tablet (0.4 mg total) under the tongue every 5 (five) minutes as needed.  Qty: 20 tablet, Refills: 12    Associated Diagnoses: AP (angina pectoris)         STOP taking these medications       clopidogrel (PLAVIX) 75 mg tablet Comments:   Reason for Stopping:               Discharge Procedure Orders (must include Diet, Follow-up, Activity)    Discharge Procedure Orders (must include Diet, Follow-up, Activity)  Diet Cardiac     Lifting restrictions   Order Comments: No lifting greater than 10lbs x 1 week     Notify your health care provider if you experience any of the following:  temperature >100.4     Notify your health care provider if you experience any of the following:  persistent nausea and vomiting or diarrhea     Notify your health care provider if you experience any of the following:  severe uncontrolled pain     Notify your health care provider if you experience any of the following:  redness, tenderness, or signs of infection (pain, swelling, redness, odor or green/yellow discharge around incision site)     Notify your health care provider if you experience any of the following:  difficulty breathing or increased cough     Notify your health care provider if you experience any of the following:  severe persistent headache     Notify your health care  provider if you experience any of the following:  worsening rash     Notify your health care provider if you experience any of the following:  persistent dizziness, light-headedness, or visual disturbances     Notify your health care provider if you experience any of the following:  increased confusion or weakness     Remove dressing in 24 hours     Activity as tolerated     Shower on day dressing removed (No bath)       Follow-up Information     [facility] In 1 week.    Specialty:  Cardiology  Why:  Hospital follow up. Clinic will call to arrange both appts   Contact information:  [address] [address] [phone]             Hernandez Sen MD In 1 month.    Specialties:  Cardiology, INTERVENTIONAL CARDIOLOGY  Contact information:  [address] [address] [phone]

## 2018-08-03 NOTE — PROGRESS NOTES
Went over discharge instructions with patient.   Stressed importance of making and keeping all follow ups.   Patient verbalized understanding and has no questions in regards to discharge.  IV removed, catheter intact.  Telemetry box removed.  Patient taken to front of hospital by wheelchair.

## 2018-08-09 ENCOUNTER — OFFICE VISIT (OUTPATIENT)
Dept: CARDIOLOGY | Facility: CLINIC | Age: 68
End: 2018-08-09
Payer: MEDICARE

## 2018-08-09 VITALS
HEIGHT: 70 IN | HEART RATE: 64 BPM | BODY MASS INDEX: 30.87 KG/M2 | SYSTOLIC BLOOD PRESSURE: 114 MMHG | WEIGHT: 215.63 LBS | DIASTOLIC BLOOD PRESSURE: 76 MMHG

## 2018-08-09 DIAGNOSIS — Z95.5 STATUS POST INSERTION OF DRUG-ELUTING STENT INTO LEFT ANTERIOR DESCENDING (LAD) ARTERY: Primary | ICD-10-CM

## 2018-08-09 DIAGNOSIS — Z98.61 HISTORY OF PTCA: ICD-10-CM

## 2018-08-09 DIAGNOSIS — I10 ESSENTIAL HYPERTENSION: ICD-10-CM

## 2018-08-09 DIAGNOSIS — I25.2 OLD MI (MYOCARDIAL INFARCTION): ICD-10-CM

## 2018-08-09 DIAGNOSIS — E78.2 MIXED HYPERLIPIDEMIA: ICD-10-CM

## 2018-08-09 DIAGNOSIS — I25.10 CORONARY ARTERY DISEASE INVOLVING NATIVE CORONARY ARTERY OF NATIVE HEART WITHOUT ANGINA PECTORIS: ICD-10-CM

## 2018-08-09 PROBLEM — I20.9 AP (ANGINA PECTORIS): Status: RESOLVED | Noted: 2018-08-01 | Resolved: 2018-08-09

## 2018-08-09 PROBLEM — I20.0 ANGINA PECTORIS, UNSTABLE: Status: RESOLVED | Noted: 2018-08-01 | Resolved: 2018-08-09

## 2018-08-09 PROBLEM — I20.0 UNSTABLE ANGINA: Status: RESOLVED | Noted: 2018-08-02 | Resolved: 2018-08-09

## 2018-08-09 PROCEDURE — 3008F BODY MASS INDEX DOCD: CPT | Mod: CPTII,S$GLB,, | Performed by: NURSE PRACTITIONER

## 2018-08-09 PROCEDURE — 99213 OFFICE O/P EST LOW 20 MIN: CPT | Mod: S$GLB,,, | Performed by: NURSE PRACTITIONER

## 2018-08-09 PROCEDURE — 3078F DIAST BP <80 MM HG: CPT | Mod: CPTII,S$GLB,, | Performed by: NURSE PRACTITIONER

## 2018-08-09 PROCEDURE — 99999 PR PBB SHADOW E&M-EST. PATIENT-LVL III: CPT | Mod: PBBFAC,,, | Performed by: NURSE PRACTITIONER

## 2018-08-09 PROCEDURE — 3074F SYST BP LT 130 MM HG: CPT | Mod: CPTII,S$GLB,, | Performed by: NURSE PRACTITIONER

## 2018-08-09 RX ORDER — PRASUGREL 10 MG/1
10 TABLET, FILM COATED ORAL DAILY
Qty: 90 TABLET | Refills: 3 | Status: SHIPPED | OUTPATIENT
Start: 2018-08-09 | End: 2019-06-03 | Stop reason: SDUPTHER

## 2018-08-09 NOTE — PROGRESS NOTES
Subjective:   Patient ID:  Harry Phan is a 67 y.o. male who presents for  Hospital Follow Up (post cath )      HPI    Mr. Phan is here in clinic today for follow up after undergoing OP cath to further evaluate unstable angina symptoms. Cath showed 95% prox LAD lesion with successful PCI using MELE x1. Also noted to have  LCX with collateral from RCA and patent RCA stent. LVEF normal . Had uneventful overnight observation. Denies any recurrent angina symptoms. Has no abnormal bleeding on DAPT. BP well controlled on current regimen. Good activity tolerance. No CNS Complaints to suggest TIA or CVA. Has no symptoms to suggest CHF.       Past Medical History:   Diagnosis Date    Acute coronary syndrome     Coronary artery disease     Hyperlipidemia     Hypertension     Old MI (myocardial infarction) 4/17/2014    Status post insertion of drug-eluting stent into left anterior descending (LAD) artery 8/3/2018       Past Surgical History:   Procedure Laterality Date    CORONARY ANGIOPLASTY      LEFT HEART CATHETERIZATION Left 8/2/2018    Procedure: CATHETERIZATION, HEART, LEFT;  Surgeon: Hernandez Sen MD;  Location: [facility] CATH LAB;  Service: Cardiology;  Laterality: Left;       Social History   Substance Use Topics    Smoking status: Never Smoker    Smokeless tobacco: Never Used    Alcohol use Yes       Family History   Problem Relation Age of Onset    Heart attack Father 68       Current Outpatient Prescriptions   Medication Sig    aspirin (ECOTRIN) 81 MG EC tablet Take 81 mg by mouth once daily.    atorvastatin (LIPITOR) 80 MG tablet Take 1 tablet (80 mg total) by mouth every evening.    isosorbide mononitrate (IMDUR) 30 MG 24 hr tablet Take 1 tablet (30 mg total) by mouth once daily.    metoprolol tartrate (LOPRESSOR) 50 MG tablet Take 1 tablet (50 mg total) by mouth 2 (two) times daily.    niacin (NIASPAN EXTENDED-RELEASE) 750 MG CR tablet Take 1 tablet (750 mg total) by mouth 2 (two) times  daily.    nitroGLYCERIN (NITROSTAT) 0.4 MG SL tablet Place 1 tablet (0.4 mg total) under the tongue every 5 (five) minutes as needed.    prasugrel (EFFIENT) 10 mg Tab Take 1 tablet (10 mg total) by mouth once daily.    ramipril (ALTACE) 1.25 MG capsule once daily.      No current facility-administered medications for this visit.      Current Outpatient Prescriptions on File Prior to Visit   Medication Sig    aspirin (ECOTRIN) 81 MG EC tablet Take 81 mg by mouth once daily.    atorvastatin (LIPITOR) 80 MG tablet Take 1 tablet (80 mg total) by mouth every evening.    isosorbide mononitrate (IMDUR) 30 MG 24 hr tablet Take 1 tablet (30 mg total) by mouth once daily.    metoprolol tartrate (LOPRESSOR) 50 MG tablet Take 1 tablet (50 mg total) by mouth 2 (two) times daily.    niacin (NIASPAN EXTENDED-RELEASE) 750 MG CR tablet Take 1 tablet (750 mg total) by mouth 2 (two) times daily.    nitroGLYCERIN (NITROSTAT) 0.4 MG SL tablet Place 1 tablet (0.4 mg total) under the tongue every 5 (five) minutes as needed.    ramipril (ALTACE) 1.25 MG capsule once daily.     [DISCONTINUED] prasugrel (EFFIENT) 10 mg Tab Take 1 tablet (10 mg total) by mouth once daily.     No current facility-administered medications on file prior to visit.        Review of Systems   Constitution: Negative for diaphoresis, weakness, malaise/fatigue, weight gain and weight loss.   HENT: Negative for congestion and nosebleeds.    Cardiovascular: Negative for chest pain, claudication, cyanosis, dyspnea on exertion, irregular heartbeat, leg swelling, near-syncope, orthopnea, palpitations, paroxysmal nocturnal dyspnea and syncope.   Respiratory: Negative for cough, hemoptysis, shortness of breath, sleep disturbances due to breathing, snoring, sputum production and wheezing.    Hematologic/Lymphatic: Negative for bleeding problem. Does not bruise/bleed easily.   Skin: Negative for rash.   Musculoskeletal: Negative for arthritis, back pain, falls,  "joint pain, muscle cramps and muscle weakness.   Gastrointestinal: Negative for abdominal pain, constipation, diarrhea, heartburn, hematemesis, hematochezia, melena and nausea.   Genitourinary: Negative for dysuria, hematuria and nocturia.   Neurological: Negative for excessive daytime sleepiness, dizziness, headaches, light-headedness, loss of balance, numbness and vertigo.       Objective:   Physical Exam   Constitutional: He is oriented to person, place, and time. He appears well-developed and well-nourished.   Neck: Neck supple. No JVD present.   Cardiovascular: Normal rate, regular rhythm, normal heart sounds and normal pulses.  Exam reveals no friction rub.    No murmur heard.  Pulses:       Radial pulses are 2+ on the right side, and 2+ on the left side.   Pulmonary/Chest: Effort normal and breath sounds normal. No respiratory distress. He has no wheezes. He has no rales.   Abdominal: Soft. Bowel sounds are normal. He exhibits no distension.   Musculoskeletal: He exhibits no edema or tenderness.   Neurological: He is alert and oriented to person, place, and time.   Skin: Skin is warm and dry. No rash noted.   Right radial access site without redness, tenderness, swelling, or drainage. There is no active external bleeding or hematoma.    Psychiatric: He has a normal mood and affect. His behavior is normal.   Nursing note and vitals reviewed.    Vitals:    08/09/18 1524   BP: 114/76   Pulse: 64   Weight: 97.8 kg (215 lb 9.8 oz)   Height: 5' 10" (1.778 m)     Lab Results   Component Value Date    CHOL 117 (L) 04/25/2016    CHOL 119 (L) 04/02/2015    CHOL 121 04/11/2014     Lab Results   Component Value Date    HDL 33 (L) 04/25/2016    HDL 36 (L) 04/02/2015    HDL 34 (L) 04/11/2014     Lab Results   Component Value Date    LDLCALC 69.4 04/25/2016    LDLCALC 68.6 04/02/2015    LDLCALC 73.2 04/11/2014     Lab Results   Component Value Date    TRIG 73 04/25/2016    TRIG 72 04/02/2015    TRIG 69 04/11/2014     Lab " Results   Component Value Date    CHOLHDL 28.2 04/25/2016    CHOLHDL 30.3 04/02/2015    CHOLHDL 28.1 04/11/2014       Chemistry        Component Value Date/Time     08/03/2018 0439    K 4.1 08/03/2018 0439     08/03/2018 0439    CO2 26 08/03/2018 0439    BUN 13 08/03/2018 0439    CREATININE 0.8 08/03/2018 0439    GLU 92 08/03/2018 0439        Component Value Date/Time    CALCIUM 8.9 08/03/2018 0439    ALKPHOS 64 08/03/2018 0439    AST 22 08/03/2018 0439    ALT 19 08/03/2018 0439    BILITOT 0.9 08/03/2018 0439    ESTGFRAFRICA >60 08/03/2018 0439    EGFRNONAA >60 08/03/2018 0439          Lab Results   Component Value Date    TSH 1.46 02/04/2010     Lab Results   Component Value Date    INR 1.1 08/02/2018     Lab Results   Component Value Date    WBC 6.94 08/03/2018    HGB 13.6 (L) 08/03/2018    HCT 40.8 08/03/2018    MCV 92 08/03/2018     (L) 08/03/2018     BMP  Sodium   Date Value Ref Range Status   08/03/2018 139 136 - 145 mmol/L Final     Potassium   Date Value Ref Range Status   08/03/2018 4.1 3.5 - 5.1 mmol/L Final     Chloride   Date Value Ref Range Status   08/03/2018 109 95 - 110 mmol/L Final     CO2   Date Value Ref Range Status   08/03/2018 26 23 - 29 mmol/L Final     BUN, Bld   Date Value Ref Range Status   08/03/2018 13 8 - 23 mg/dL Final     Creatinine   Date Value Ref Range Status   08/03/2018 0.8 0.5 - 1.4 mg/dL Final     Calcium   Date Value Ref Range Status   08/03/2018 8.9 8.7 - 10.5 mg/dL Final     Anion Gap   Date Value Ref Range Status   08/03/2018 4 (L) 8 - 16 mmol/L Final     eGFR if    Date Value Ref Range Status   08/03/2018 >60 >60 mL/min/1.73 m^2 Final     eGFR if non    Date Value Ref Range Status   08/03/2018 >60 >60 mL/min/1.73 m^2 Final     Comment:     Calculation used to obtain the estimated glomerular filtration  rate (eGFR) is the CKD-EPI equation.        Estimated Creatinine Clearance: 107.9 mL/min (based on SCr of 0.8  mg/dL).    Assessment:     1. Status post insertion of drug-eluting stent into left anterior descending (LAD) artery    2. Old MI (myocardial infarction)    3. Mixed hyperlipidemia    4. Essential hypertension    5. History of PTCA    6. Coronary artery disease involving native coronary artery of native heart without angina pectoris    Denies any recurrent angina symptoms  Has no abnormal bleeding on DAPT  BP well controlled on current regimen   Good activity tolerance  No CNS Complaints to suggest TIA or CVA    Plan:     Continue current medical management   Can resume usual activity   Heart healthy diet  Exercise program as tolerated   Risk factor modification   RTC as scheduled with Dr. Sen

## 2018-09-04 ENCOUNTER — CLINICAL SUPPORT (OUTPATIENT)
Dept: CARDIOLOGY | Facility: CLINIC | Age: 68
End: 2018-09-04
Attending: INTERNAL MEDICINE
Payer: MEDICARE

## 2018-09-04 DIAGNOSIS — I10 ESSENTIAL HYPERTENSION: ICD-10-CM

## 2018-09-04 DIAGNOSIS — Z98.61 HISTORY OF PTCA: ICD-10-CM

## 2018-09-04 DIAGNOSIS — I25.2 OLD MI (MYOCARDIAL INFARCTION): ICD-10-CM

## 2018-09-04 DIAGNOSIS — I25.10 CORONARY ARTERY DISEASE INVOLVING NATIVE CORONARY ARTERY OF NATIVE HEART WITHOUT ANGINA PECTORIS: ICD-10-CM

## 2018-09-05 ENCOUNTER — OFFICE VISIT (OUTPATIENT)
Dept: CARDIOLOGY | Facility: CLINIC | Age: 68
End: 2018-09-05
Payer: MEDICARE

## 2018-09-05 VITALS
DIASTOLIC BLOOD PRESSURE: 80 MMHG | HEIGHT: 70 IN | HEART RATE: 84 BPM | BODY MASS INDEX: 30.75 KG/M2 | SYSTOLIC BLOOD PRESSURE: 138 MMHG | WEIGHT: 214.81 LBS

## 2018-09-05 DIAGNOSIS — Z95.5 STATUS POST INSERTION OF DRUG-ELUTING STENT INTO LEFT ANTERIOR DESCENDING (LAD) ARTERY: ICD-10-CM

## 2018-09-05 DIAGNOSIS — E78.2 MIXED HYPERLIPIDEMIA: ICD-10-CM

## 2018-09-05 DIAGNOSIS — I25.10 CORONARY ARTERY DISEASE INVOLVING NATIVE CORONARY ARTERY OF NATIVE HEART WITHOUT ANGINA PECTORIS: Primary | ICD-10-CM

## 2018-09-05 DIAGNOSIS — I25.2 OLD MI (MYOCARDIAL INFARCTION): ICD-10-CM

## 2018-09-05 DIAGNOSIS — I10 ESSENTIAL HYPERTENSION: ICD-10-CM

## 2018-09-05 DIAGNOSIS — I20.9 AP (ANGINA PECTORIS): ICD-10-CM

## 2018-09-05 DIAGNOSIS — Z98.61 HISTORY OF PTCA: ICD-10-CM

## 2018-09-05 PROCEDURE — 99213 OFFICE O/P EST LOW 20 MIN: CPT | Mod: S$PBB,,, | Performed by: INTERNAL MEDICINE

## 2018-09-05 PROCEDURE — 3079F DIAST BP 80-89 MM HG: CPT | Mod: CPTII,,, | Performed by: INTERNAL MEDICINE

## 2018-09-05 PROCEDURE — 3008F BODY MASS INDEX DOCD: CPT | Mod: CPTII,,, | Performed by: INTERNAL MEDICINE

## 2018-09-05 PROCEDURE — 3075F SYST BP GE 130 - 139MM HG: CPT | Mod: CPTII,,, | Performed by: INTERNAL MEDICINE

## 2018-09-05 PROCEDURE — 99213 OFFICE O/P EST LOW 20 MIN: CPT | Mod: PBBFAC | Performed by: INTERNAL MEDICINE

## 2018-09-05 PROCEDURE — 99999 PR PBB SHADOW E&M-EST. PATIENT-LVL III: CPT | Mod: PBBFAC,,, | Performed by: INTERNAL MEDICINE

## 2018-09-05 RX ORDER — ISOSORBIDE MONONITRATE 30 MG/1
30 TABLET, EXTENDED RELEASE ORAL DAILY
Qty: 90 TABLET | Refills: 3 | Status: SHIPPED | OUTPATIENT
Start: 2018-09-05 | End: 2019-09-05

## 2018-09-05 NOTE — PROGRESS NOTES
Subjective:    Patient ID:  Harry Phan is a 67 y.o. male who presents for evaluation of Follow-up; Hyperlipidemia; Hypertension; and Coronary Artery Disease      HPI pt presents for f/u.   His current medical conditions include CAD, dyslipidemia. Nonsmoker.   Past hx pertinent for following:  s/p NSTEMI 12/08 and was found to have a 100% chronic occluded mid-LCX artery and a 95% mid-RCA lesion (culprit vessel) and underwent successful PCI with a 4 x 23 mm Vision BMS. Medical mgt was advised for his occluded LCX. He also was noted to have nonobstructive LAD disease at the time.  Now here for f/u.  Pt seen 8/18 for recurrent exertional angina and had [facility] next day with PCI performed of critical 95% prox LAD stenosis with Stent Resolute MELE x one,  LCX and patent RCA stent,  Normal EF.  He feels good.  No recurrent angina.  No chf sxs.  Compliant with meds.  Resumed normal activity.  BP and lipids controlled.    Current Outpatient Medications:     aspirin (ECOTRIN) 81 MG EC tablet, Take 81 mg by mouth once daily., Disp: , Rfl:     atorvastatin (LIPITOR) 80 MG tablet, Take 1 tablet (80 mg total) by mouth every evening., Disp: 90 tablet, Rfl: 3    metoprolol tartrate (LOPRESSOR) 50 MG tablet, Take 1 tablet (50 mg total) by mouth 2 (two) times daily., Disp: 180 tablet, Rfl: 3    niacin (NIASPAN EXTENDED-RELEASE) 750 MG CR tablet, Take 1 tablet (750 mg total) by mouth 2 (two) times daily., Disp: 180 tablet, Rfl: 3    prasugrel (EFFIENT) 10 mg Tab, Take 1 tablet (10 mg total) by mouth once daily., Disp: 90 tablet, Rfl: 3    ramipril (ALTACE) 1.25 MG capsule, once daily. , Disp: , Rfl:     isosorbide mononitrate (IMDUR) 30 MG 24 hr tablet, Take 1 tablet (30 mg total) by mouth once daily., Disp: 90 tablet, Rfl: 3    nitroGLYCERIN (NITROSTAT) 0.4 MG SL tablet, Place 1 tablet (0.4 mg total) under the tongue every 5 (five) minutes as needed., Disp: 20 tablet, Rfl: 12    Review of Systems   Constitution:  "Negative.   HENT: Negative.    Eyes: Negative.    Cardiovascular: Negative.    Respiratory: Negative.    Endocrine: Negative.    Hematologic/Lymphatic: Negative.    Skin: Negative.    Musculoskeletal: Negative.    Gastrointestinal: Negative.    Genitourinary: Negative.    Neurological: Negative.    Psychiatric/Behavioral: Negative.    Allergic/Immunologic: Negative.        /80 (BP Location: Left arm, Patient Position: Sitting, BP Method: Medium (Manual))   Pulse 84   Ht 5' 10" (1.778 m)   Wt 97.4 kg (214 lb 13.4 oz)   BMI 30.83 kg/m²     Wt Readings from Last 3 Encounters:   09/05/18 97.4 kg (214 lb 13.4 oz)   08/09/18 97.8 kg (215 lb 9.8 oz)   08/02/18 97.7 kg (215 lb 6.2 oz)     Temp Readings from Last 3 Encounters:   08/03/18 96.7 °F (35.9 °C) (Oral)     BP Readings from Last 3 Encounters:   09/05/18 138/80   08/09/18 114/76   08/03/18 137/77     Pulse Readings from Last 3 Encounters:   09/05/18 84   08/09/18 64   08/03/18 67          Objective:    Physical Exam   Constitutional: He is oriented to person, place, and time. He appears well-developed and well-nourished.   HENT:   Head: Normocephalic.   Neck: Normal range of motion. Neck supple. No JVD present. Carotid bruit is not present. No thyromegaly present.   Cardiovascular: Normal rate, regular rhythm, S1 normal and S2 normal. PMI is not displaced. Exam reveals no S3, no S4, no distant heart sounds, no friction rub, no midsystolic click and no opening snap.   No murmur heard.  Pulses:       Radial pulses are 2+ on the right side, and 2+ on the left side.   Right radial site without erythema, normal site.   Pulmonary/Chest: Effort normal and breath sounds normal. He has no wheezes. He has no rales.   Abdominal: Soft. Bowel sounds are normal. He exhibits no distension and no mass. There is no tenderness.   Musculoskeletal: He exhibits no edema.   Neurological: He is alert and oriented to person, place, and time.   Skin: Skin is warm.   Psychiatric: He " has a normal mood and affect. His behavior is normal.   Nursing note and vitals reviewed.      I have reviewed all pertinent labs and cardiac studies.          Assessment:       1. Coronary artery disease involving native coronary artery of native heart without angina pectoris    2. Old MI (myocardial infarction)    3. Mixed hyperlipidemia    4. History of PTCA    5. Status post insertion of drug-eluting stent into left anterior descending (LAD) artery    6. Essential hypertension    7. AP (angina pectoris)         Plan:             S/p PCI critical prox LAD stenosis MELE x one with resolution of anginal sxs.  Continue current medical tx.  Pt counseled on compliance with asa, effient.  No changes in meds today.e  Exercise daily.  Cardiac diet  F/u 2 months with lipids.

## 2018-11-02 ENCOUNTER — LAB VISIT (OUTPATIENT)
Dept: LAB | Facility: HOSPITAL | Age: 68
End: 2018-11-02
Attending: INTERNAL MEDICINE
Payer: MEDICARE

## 2018-11-02 DIAGNOSIS — E78.2 MIXED HYPERLIPIDEMIA: ICD-10-CM

## 2018-11-02 LAB
ALBUMIN SERPL BCP-MCNC: 3.7 G/DL
ALP SERPL-CCNC: 74 U/L
ALT SERPL W/O P-5'-P-CCNC: 20 U/L
ANION GAP SERPL CALC-SCNC: 6 MMOL/L
AST SERPL-CCNC: 25 U/L
BILIRUB SERPL-MCNC: 1.8 MG/DL
BUN SERPL-MCNC: 8 MG/DL
CALCIUM SERPL-MCNC: 9.8 MG/DL
CHLORIDE SERPL-SCNC: 109 MMOL/L
CHOLEST SERPL-MCNC: 106 MG/DL
CHOLEST/HDLC SERPL: 3 {RATIO}
CO2 SERPL-SCNC: 26 MMOL/L
CREAT SERPL-MCNC: 0.9 MG/DL
EST. GFR  (AFRICAN AMERICAN): >60 ML/MIN/1.73 M^2
EST. GFR  (NON AFRICAN AMERICAN): >60 ML/MIN/1.73 M^2
GLUCOSE SERPL-MCNC: 92 MG/DL
HDLC SERPL-MCNC: 35 MG/DL
HDLC SERPL: 33 %
LDLC SERPL CALC-MCNC: 55.4 MG/DL
NONHDLC SERPL-MCNC: 71 MG/DL
POTASSIUM SERPL-SCNC: 4.4 MMOL/L
PROT SERPL-MCNC: 6.7 G/DL
SODIUM SERPL-SCNC: 141 MMOL/L
TRIGL SERPL-MCNC: 78 MG/DL

## 2018-11-02 PROCEDURE — 80053 COMPREHEN METABOLIC PANEL: CPT

## 2018-11-02 PROCEDURE — 80061 LIPID PANEL: CPT

## 2018-11-02 PROCEDURE — 36415 COLL VENOUS BLD VENIPUNCTURE: CPT

## 2018-11-09 ENCOUNTER — OFFICE VISIT (OUTPATIENT)
Dept: CARDIOLOGY | Facility: CLINIC | Age: 68
End: 2018-11-09
Payer: MEDICARE

## 2018-11-09 VITALS
HEIGHT: 70 IN | BODY MASS INDEX: 31.25 KG/M2 | WEIGHT: 218.25 LBS | HEART RATE: 80 BPM | DIASTOLIC BLOOD PRESSURE: 84 MMHG | SYSTOLIC BLOOD PRESSURE: 144 MMHG

## 2018-11-09 DIAGNOSIS — I10 ESSENTIAL HYPERTENSION: Primary | ICD-10-CM

## 2018-11-09 DIAGNOSIS — Z98.61 HISTORY OF PTCA: ICD-10-CM

## 2018-11-09 DIAGNOSIS — I25.10 CORONARY ARTERY DISEASE INVOLVING NATIVE CORONARY ARTERY OF NATIVE HEART WITHOUT ANGINA PECTORIS: ICD-10-CM

## 2018-11-09 DIAGNOSIS — I25.2 OLD MI (MYOCARDIAL INFARCTION): ICD-10-CM

## 2018-11-09 DIAGNOSIS — E78.2 MIXED HYPERLIPIDEMIA: ICD-10-CM

## 2018-11-09 DIAGNOSIS — Z95.5 STATUS POST INSERTION OF DRUG-ELUTING STENT INTO LEFT ANTERIOR DESCENDING (LAD) ARTERY: ICD-10-CM

## 2018-11-09 PROCEDURE — 3077F SYST BP >= 140 MM HG: CPT | Mod: CPTII,S$GLB,, | Performed by: INTERNAL MEDICINE

## 2018-11-09 PROCEDURE — 99214 OFFICE O/P EST MOD 30 MIN: CPT | Mod: S$GLB,,, | Performed by: INTERNAL MEDICINE

## 2018-11-09 PROCEDURE — 3008F BODY MASS INDEX DOCD: CPT | Mod: CPTII,S$GLB,, | Performed by: INTERNAL MEDICINE

## 2018-11-09 PROCEDURE — 99999 PR PBB SHADOW E&M-EST. PATIENT-LVL III: CPT | Mod: PBBFAC,,, | Performed by: INTERNAL MEDICINE

## 2018-11-09 PROCEDURE — 3079F DIAST BP 80-89 MM HG: CPT | Mod: CPTII,S$GLB,, | Performed by: INTERNAL MEDICINE

## 2018-11-09 RX ORDER — RAMIPRIL 2.5 MG/1
2.5 CAPSULE ORAL DAILY
Qty: 90 CAPSULE | Refills: 3 | Status: SHIPPED | OUTPATIENT
Start: 2018-11-09 | End: 2019-09-04 | Stop reason: SDUPTHER

## 2018-11-09 NOTE — PROGRESS NOTES
Subjective:    Patient ID:  Harry Phan is a 67 y.o. male who presents for evaluation of Follow-up; Hyperlipidemia; Hypertension; Coronary Artery Disease; and Risk Factor Management For Atherosclerosis      HPI pt presents for f/u.   His current medical conditions include CAD, dyslipidemia. Nonsmoker.   Past hx pertinent for following:  s/p NSTEMI 12/08 and was found to have a 100% chronic occluded mid-LCX artery and a 95% mid-RCA lesion (culprit vessel) and underwent successful PCI with a 4 x 23 mm Vision BMS. Medical mgt was advised for his occluded LCX. He also was noted to have nonobstructive LAD disease at the time.  Pt seen 8/18 for recurrent exertional angina and had [facility] next day with PCI performed of critical 95% prox LAD stenosis with Stent Resolute MELE x one,  LCX and patent RCA stent,  Normal EF.  Now here for f/u.  CAD is stable.  No recurrent anginal sxs on current tx.  Denies cp sxs.  No CHF sxs.  No dyspnea, pnd/orthopnea.  Lipids well controlled, LDL perfect on statin.  BP running above goal.  No associated sxs.   Compliant with meds.  Mindful of diet.  Doing some exercise.    Current Outpatient Medications:     aspirin (ECOTRIN) 81 MG EC tablet, Take 81 mg by mouth once daily., Disp: , Rfl:     atorvastatin (LIPITOR) 80 MG tablet, Take 1 tablet (80 mg total) by mouth every evening., Disp: 90 tablet, Rfl: 3    isosorbide mononitrate (IMDUR) 30 MG 24 hr tablet, Take 1 tablet (30 mg total) by mouth once daily., Disp: 90 tablet, Rfl: 3    metoprolol tartrate (LOPRESSOR) 50 MG tablet, Take 1 tablet (50 mg total) by mouth 2 (two) times daily., Disp: 180 tablet, Rfl: 3    niacin (NIASPAN EXTENDED-RELEASE) 750 MG CR tablet, Take 1 tablet (750 mg total) by mouth 2 (two) times daily., Disp: 180 tablet, Rfl: 3    prasugrel (EFFIENT) 10 mg Tab, Take 1 tablet (10 mg total) by mouth once daily., Disp: 90 tablet, Rfl: 3    ramipril (ALTACE) 1.25 MG capsule, once daily. , Disp: , Rfl:      "nitroGLYCERIN (NITROSTAT) 0.4 MG SL tablet, Place 1 tablet (0.4 mg total) under the tongue every 5 (five) minutes as needed., Disp: 20 tablet, Rfl: 12    phenylephrine-acetaminophen 5-325 mg Cap, , Disp: , Rfl:     Review of Systems   Constitution: Negative.   HENT: Negative.    Eyes: Negative.    Cardiovascular: Negative.    Respiratory: Negative.    Endocrine: Negative.    Hematologic/Lymphatic: Negative.    Skin: Negative.    Musculoskeletal: Negative.    Gastrointestinal: Negative.    Genitourinary: Negative.    Neurological: Negative.    Psychiatric/Behavioral: Negative.    Allergic/Immunologic: Negative.        BP (!) 144/84 (BP Location: Left arm, Patient Position: Sitting, BP Method: Medium (Manual))   Pulse 80   Ht 5' 10" (1.778 m)   Wt 99 kg (218 lb 4.1 oz)   BMI 31.32 kg/m²       Wt Readings from Last 3 Encounters:   11/09/18 99 kg (218 lb 4.1 oz)   09/05/18 97.4 kg (214 lb 13.4 oz)   08/09/18 97.8 kg (215 lb 9.8 oz)     Temp Readings from Last 3 Encounters:   08/03/18 96.7 °F (35.9 °C) (Oral)     BP Readings from Last 3 Encounters:   11/09/18 (!) 144/84   09/05/18 138/80   08/09/18 114/76     Pulse Readings from Last 3 Encounters:   11/09/18 80   09/05/18 84   08/09/18 64        Objective:    Physical Exam   Constitutional: He is oriented to person, place, and time. He appears well-developed and well-nourished.   HENT:   Head: Normocephalic.   Neck: Normal range of motion. Neck supple. Normal carotid pulses, no hepatojugular reflux and no JVD present. Carotid bruit is not present. No thyromegaly present.   Cardiovascular: Normal rate, regular rhythm, S1 normal and S2 normal. PMI is not displaced. Exam reveals no S3, no S4, no distant heart sounds, no friction rub, no midsystolic click and no opening snap.   No murmur heard.  Pulses:       Radial pulses are 2+ on the right side, and 2+ on the left side.   Pulmonary/Chest: Effort normal and breath sounds normal. He has no wheezes. He has no rales. "   Abdominal: Soft. Bowel sounds are normal. He exhibits no distension, no abdominal bruit, no ascites and no mass. There is no tenderness.   Musculoskeletal: He exhibits no edema.   Neurological: He is alert and oriented to person, place, and time.   Skin: Skin is warm.   Psychiatric: He has a normal mood and affect. His behavior is normal.   Nursing note and vitals reviewed.      I have reviewed all pertinent labs and cardiac studies.      Chemistry        Component Value Date/Time     11/02/2018 0854    K 4.4 11/02/2018 0854     11/02/2018 0854    CO2 26 11/02/2018 0854    BUN 8 11/02/2018 0854    CREATININE 0.9 11/02/2018 0854    GLU 92 11/02/2018 0854        Component Value Date/Time    CALCIUM 9.8 11/02/2018 0854    ALKPHOS 74 11/02/2018 0854    AST 25 11/02/2018 0854    ALT 20 11/02/2018 0854    BILITOT 1.8 (H) 11/02/2018 0854    ESTGFRAFRICA >60.0 11/02/2018 0854    EGFRNONAA >60.0 11/02/2018 0854        Lab Results   Component Value Date    CHOL 106 (L) 11/02/2018    CHOL 117 (L) 04/25/2016    CHOL 119 (L) 04/02/2015     Lab Results   Component Value Date    HDL 35 (L) 11/02/2018    HDL 33 (L) 04/25/2016    HDL 36 (L) 04/02/2015     Lab Results   Component Value Date    LDLCALC 55.4 (L) 11/02/2018    LDLCALC 69.4 04/25/2016    LDLCALC 68.6 04/02/2015     Lab Results   Component Value Date    TRIG 78 11/02/2018    TRIG 73 04/25/2016    TRIG 72 04/02/2015     Lab Results   Component Value Date    CHOLHDL 33.0 11/02/2018    CHOLHDL 28.2 04/25/2016    CHOLHDL 30.3 04/02/2015           Assessment:       1. Essential hypertension    2. Status post insertion of drug-eluting stent into left anterior descending (LAD) artery    3. Old MI (myocardial infarction)    4. Mixed hyperlipidemia    5. History of PTCA    6. Coronary artery disease involving native coronary artery of native heart without angina pectoris         Plan:             Stable CAD/CV conditions on current medical tx.  S/p PCI critical LAD  stenosis 8/18 with MELE, and good clinical results with resolution of angina.  Would like his BP better.  Increase Ramipril to 2.5 mg qd.  Indications, side effects discussed.  Continue OMT for CAD.  Cardiac diet  Exercise daily x 30 + minutes.  Reviewed tests with pt.  Continue statin for lipids/CAD.    F/u 3 months.

## 2019-01-01 DIAGNOSIS — I25.10 CORONARY ARTERY DISEASE, ANGINA PRESENCE UNSPECIFIED, UNSPECIFIED VESSEL OR LESION TYPE, UNSPECIFIED WHETHER NATIVE OR TRANSPLANTED HEART: ICD-10-CM

## 2019-01-01 DIAGNOSIS — E78.2 MIXED HYPERLIPIDEMIA: ICD-10-CM

## 2019-01-01 DIAGNOSIS — I10 ESSENTIAL HYPERTENSION: ICD-10-CM

## 2019-01-02 RX ORDER — ATORVASTATIN CALCIUM 80 MG/1
TABLET, FILM COATED ORAL
Qty: 90 TABLET | Refills: 3 | Status: SHIPPED | OUTPATIENT
Start: 2019-01-02 | End: 2019-02-22 | Stop reason: SDUPTHER

## 2019-01-02 RX ORDER — METOPROLOL TARTRATE 50 MG/1
TABLET ORAL
Qty: 180 TABLET | Refills: 3 | Status: SHIPPED | OUTPATIENT
Start: 2019-01-02 | End: 2019-09-30 | Stop reason: SDUPTHER

## 2019-02-15 ENCOUNTER — OFFICE VISIT (OUTPATIENT)
Dept: CARDIOLOGY | Facility: CLINIC | Age: 69
End: 2019-02-15
Payer: MEDICARE

## 2019-02-15 VITALS
WEIGHT: 210.56 LBS | HEIGHT: 70 IN | BODY MASS INDEX: 30.14 KG/M2 | SYSTOLIC BLOOD PRESSURE: 128 MMHG | DIASTOLIC BLOOD PRESSURE: 76 MMHG | HEART RATE: 68 BPM

## 2019-02-15 DIAGNOSIS — I25.2 OLD MI (MYOCARDIAL INFARCTION): ICD-10-CM

## 2019-02-15 DIAGNOSIS — E78.2 MIXED HYPERLIPIDEMIA: ICD-10-CM

## 2019-02-15 DIAGNOSIS — I25.118 CORONARY ARTERY DISEASE OF NATIVE ARTERY OF NATIVE HEART WITH STABLE ANGINA PECTORIS: ICD-10-CM

## 2019-02-15 DIAGNOSIS — Z98.61 HISTORY OF PTCA: ICD-10-CM

## 2019-02-15 DIAGNOSIS — Z95.5 STATUS POST INSERTION OF DRUG-ELUTING STENT INTO LEFT ANTERIOR DESCENDING (LAD) ARTERY: Primary | ICD-10-CM

## 2019-02-15 DIAGNOSIS — I10 ESSENTIAL HYPERTENSION: ICD-10-CM

## 2019-02-15 PROCEDURE — 3074F SYST BP LT 130 MM HG: CPT | Mod: CPTII,S$GLB,, | Performed by: INTERNAL MEDICINE

## 2019-02-15 PROCEDURE — 99999 PR PBB SHADOW E&M-EST. PATIENT-LVL III: CPT | Mod: PBBFAC,,, | Performed by: INTERNAL MEDICINE

## 2019-02-15 PROCEDURE — 99213 OFFICE O/P EST LOW 20 MIN: CPT | Mod: S$GLB,,, | Performed by: INTERNAL MEDICINE

## 2019-02-15 PROCEDURE — 99213 PR OFFICE/OUTPT VISIT, EST, LEVL III, 20-29 MIN: ICD-10-PCS | Mod: S$GLB,,, | Performed by: INTERNAL MEDICINE

## 2019-02-15 PROCEDURE — 3078F DIAST BP <80 MM HG: CPT | Mod: CPTII,S$GLB,, | Performed by: INTERNAL MEDICINE

## 2019-02-15 PROCEDURE — 99999 PR PBB SHADOW E&M-EST. PATIENT-LVL III: ICD-10-PCS | Mod: PBBFAC,,, | Performed by: INTERNAL MEDICINE

## 2019-02-15 PROCEDURE — 3074F PR MOST RECENT SYSTOLIC BLOOD PRESSURE < 130 MM HG: ICD-10-PCS | Mod: CPTII,S$GLB,, | Performed by: INTERNAL MEDICINE

## 2019-02-15 PROCEDURE — 3078F PR MOST RECENT DIASTOLIC BLOOD PRESSURE < 80 MM HG: ICD-10-PCS | Mod: CPTII,S$GLB,, | Performed by: INTERNAL MEDICINE

## 2019-02-15 NOTE — PROGRESS NOTES
Subjective:    Patient ID:  Harry Phan is a 68 y.o. male who presents for evaluation of Follow-up; Hypertension; Hyperlipidemia; Coronary Artery Disease; and Risk Factor Management For Atherosclerosis        HPI pt presents for f/u.   His current medical conditions include CAD, HTN, dyslipidemia. Nonsmoker.   Past hx pertinent for following:  s/p NSTEMI 12/08 and was found to have a 100% chronic occluded mid-LCX artery and a 95% mid-RCA lesion (culprit vessel) and underwent successful PCI with a 4 x 23 mm Vision BMS. Medical mgt was advised for his occluded LCX. He also was noted to have nonobstructive LAD disease at the time.  Pt seen 8/18 for recurrent exertional angina and had [facility] next day with PCI performed of critical 95% prox LAD stenosis with Stent Resolute MELE x one,  LCX and patent RCA stent,  Normal EF.  Now here for f/u.  Ramipril increased last appt.  No anginal or CHF sxs.  Some exercise.  Mindful of diet  Compliant with meds.  BP and lipids controlled on current therapy.      Current Outpatient Medications:     aspirin (ECOTRIN) 81 MG EC tablet, Take 81 mg by mouth once daily., Disp: , Rfl:     atorvastatin (LIPITOR) 80 MG tablet, TAKE 1 TABLET EVERY EVENING, Disp: 90 tablet, Rfl: 3    isosorbide mononitrate (IMDUR) 30 MG 24 hr tablet, Take 1 tablet (30 mg total) by mouth once daily., Disp: 90 tablet, Rfl: 3    metoprolol tartrate (LOPRESSOR) 50 MG tablet, TAKE 1 TABLET TWICE DAILY, Disp: 180 tablet, Rfl: 3    niacin (NIASPAN EXTENDED-RELEASE) 750 MG CR tablet, Take 1 tablet (750 mg total) by mouth 2 (two) times daily., Disp: 180 tablet, Rfl: 3    phenylephrine-acetaminophen 5-325 mg Cap, , Disp: , Rfl:     prasugrel (EFFIENT) 10 mg Tab, Take 1 tablet (10 mg total) by mouth once daily., Disp: 90 tablet, Rfl: 3    ramipril (ALTACE) 2.5 MG capsule, Take 1 capsule (2.5 mg total) by mouth once daily., Disp: 90 capsule, Rfl: 3    nitroGLYCERIN (NITROSTAT) 0.4 MG SL tablet, Place 1 tablet  "(0.4 mg total) under the tongue every 5 (five) minutes as needed., Disp: 20 tablet, Rfl: 12      Review of Systems   Constitution: Negative.   HENT: Negative.    Eyes: Negative.    Cardiovascular: Negative.    Respiratory: Negative.    Endocrine: Negative.    Hematologic/Lymphatic: Negative.    Skin: Negative.    Musculoskeletal: Positive for arthritis and joint pain.   Gastrointestinal: Negative.    Genitourinary: Negative.    Neurological: Negative.    Psychiatric/Behavioral: Negative.    Allergic/Immunologic: Negative.        /76 (BP Location: Left arm, Patient Position: Sitting, BP Method: Medium (Manual))   Pulse 68   Ht 5' 10" (1.778 m)   Wt 95.5 kg (210 lb 8.6 oz)   BMI 30.21 kg/m²     Wt Readings from Last 3 Encounters:   02/15/19 95.5 kg (210 lb 8.6 oz)   11/09/18 99 kg (218 lb 4.1 oz)   09/05/18 97.4 kg (214 lb 13.4 oz)     Temp Readings from Last 3 Encounters:   08/03/18 96.7 °F (35.9 °C) (Oral)     BP Readings from Last 3 Encounters:   02/15/19 128/76   11/09/18 (!) 144/84   09/05/18 138/80     Pulse Readings from Last 3 Encounters:   02/15/19 68   11/09/18 80   09/05/18 84          Objective:    Physical Exam   Constitutional: He is oriented to person, place, and time. He appears well-developed and well-nourished.   HENT:   Head: Normocephalic.   Neck: Normal range of motion. Neck supple. No JVD present. Carotid bruit is not present.   Cardiovascular: Normal rate, regular rhythm, S1 normal and S2 normal. PMI is not displaced. Exam reveals no S3, no S4, no distant heart sounds, no friction rub, no midsystolic click and no opening snap.   No murmur heard.  Pulses:       Radial pulses are 2+ on the right side, and 2+ on the left side.   Pulmonary/Chest: Effort normal and breath sounds normal. He has no wheezes. He has no rales.   Abdominal: Soft. Bowel sounds are normal. He exhibits no distension and no abdominal bruit. There is no tenderness.   Musculoskeletal: He exhibits no edema. "   Neurological: He is alert and oriented to person, place, and time.   Skin: Skin is warm.   Psychiatric: He has a normal mood and affect. His behavior is normal.   Nursing note and vitals reviewed.      I have reviewed all pertinent labs and cardiac studies.          Assessment:       1. Status post insertion of drug-eluting stent into left anterior descending (LAD) artery    2. Old MI (myocardial infarction)    3. Mixed hyperlipidemia    4. Essential hypertension    5. History of PTCA    6. Coronary artery disease of native artery of native heart with stable angina pectoris         Plan:             Continue current medical tx.  Cardiac diet  Daily exercise  F/u 4 months w labs.

## 2019-02-22 DIAGNOSIS — E78.2 MIXED HYPERLIPIDEMIA: ICD-10-CM

## 2019-02-22 DIAGNOSIS — I25.10 CORONARY ARTERY DISEASE, ANGINA PRESENCE UNSPECIFIED, UNSPECIFIED VESSEL OR LESION TYPE, UNSPECIFIED WHETHER NATIVE OR TRANSPLANTED HEART: ICD-10-CM

## 2019-02-22 RX ORDER — ATORVASTATIN CALCIUM 80 MG/1
TABLET, FILM COATED ORAL
Qty: 90 TABLET | Refills: 3 | Status: SHIPPED | OUTPATIENT
Start: 2019-02-22 | End: 2020-01-17

## 2019-05-01 RX ORDER — ISOSORBIDE MONONITRATE 30 MG/1
TABLET, EXTENDED RELEASE ORAL
Qty: 90 TABLET | Refills: 3 | Status: SHIPPED | OUTPATIENT
Start: 2019-05-01 | End: 2020-01-17

## 2019-06-04 RX ORDER — PRASUGREL 10 MG/1
TABLET, FILM COATED ORAL
Qty: 90 TABLET | Refills: 3 | Status: SHIPPED | OUTPATIENT
Start: 2019-06-04 | End: 2020-02-19 | Stop reason: ALTCHOICE

## 2019-06-25 ENCOUNTER — LAB VISIT (OUTPATIENT)
Dept: LAB | Facility: HOSPITAL | Age: 69
End: 2019-06-25
Attending: INTERNAL MEDICINE
Payer: MEDICARE

## 2019-06-25 DIAGNOSIS — E78.2 MIXED HYPERLIPIDEMIA: ICD-10-CM

## 2019-06-25 DIAGNOSIS — I25.118 CORONARY ARTERY DISEASE OF NATIVE ARTERY OF NATIVE HEART WITH STABLE ANGINA PECTORIS: ICD-10-CM

## 2019-06-25 LAB
ALBUMIN SERPL BCP-MCNC: 3.7 G/DL (ref 3.5–5.2)
ALP SERPL-CCNC: 72 U/L (ref 55–135)
ALT SERPL W/O P-5'-P-CCNC: 20 U/L (ref 10–44)
ANION GAP SERPL CALC-SCNC: 5 MMOL/L (ref 8–16)
AST SERPL-CCNC: 24 U/L (ref 10–40)
BILIRUB SERPL-MCNC: 1 MG/DL (ref 0.1–1)
BUN SERPL-MCNC: 12 MG/DL (ref 8–23)
CALCIUM SERPL-MCNC: 10.2 MG/DL (ref 8.7–10.5)
CHLORIDE SERPL-SCNC: 106 MMOL/L (ref 95–110)
CHOLEST SERPL-MCNC: 109 MG/DL (ref 120–199)
CHOLEST/HDLC SERPL: 2.7 {RATIO} (ref 2–5)
CO2 SERPL-SCNC: 28 MMOL/L (ref 23–29)
CREAT SERPL-MCNC: 1 MG/DL (ref 0.5–1.4)
CRP SERPL-MCNC: 0.28 MG/L (ref 0–3.19)
EST. GFR  (AFRICAN AMERICAN): >60 ML/MIN/1.73 M^2
EST. GFR  (NON AFRICAN AMERICAN): >60 ML/MIN/1.73 M^2
GLUCOSE SERPL-MCNC: 95 MG/DL (ref 70–110)
HDLC SERPL-MCNC: 40 MG/DL (ref 40–75)
HDLC SERPL: 36.7 % (ref 20–50)
LDLC SERPL CALC-MCNC: 58 MG/DL (ref 63–159)
NONHDLC SERPL-MCNC: 69 MG/DL
POTASSIUM SERPL-SCNC: 4.3 MMOL/L (ref 3.5–5.1)
PROT SERPL-MCNC: 7.2 G/DL (ref 6–8.4)
SODIUM SERPL-SCNC: 139 MMOL/L (ref 136–145)
TRIGL SERPL-MCNC: 55 MG/DL (ref 30–150)

## 2019-06-25 PROCEDURE — 80061 LIPID PANEL: CPT

## 2019-06-25 PROCEDURE — 36415 COLL VENOUS BLD VENIPUNCTURE: CPT

## 2019-06-25 PROCEDURE — 86141 C-REACTIVE PROTEIN HS: CPT

## 2019-06-25 PROCEDURE — 80053 COMPREHEN METABOLIC PANEL: CPT

## 2019-06-28 ENCOUNTER — OFFICE VISIT (OUTPATIENT)
Dept: CARDIOLOGY | Facility: CLINIC | Age: 69
End: 2019-06-28
Payer: MEDICARE

## 2019-06-28 VITALS
OXYGEN SATURATION: 95 % | SYSTOLIC BLOOD PRESSURE: 130 MMHG | HEART RATE: 65 BPM | BODY MASS INDEX: 30.08 KG/M2 | WEIGHT: 210.13 LBS | DIASTOLIC BLOOD PRESSURE: 84 MMHG | HEIGHT: 70 IN

## 2019-06-28 DIAGNOSIS — Z95.5 STATUS POST INSERTION OF DRUG-ELUTING STENT INTO LEFT ANTERIOR DESCENDING (LAD) ARTERY: ICD-10-CM

## 2019-06-28 DIAGNOSIS — Z98.61 HISTORY OF PTCA: ICD-10-CM

## 2019-06-28 DIAGNOSIS — I10 ESSENTIAL HYPERTENSION: ICD-10-CM

## 2019-06-28 DIAGNOSIS — E78.2 MIXED HYPERLIPIDEMIA: ICD-10-CM

## 2019-06-28 DIAGNOSIS — I25.2 OLD MI (MYOCARDIAL INFARCTION): ICD-10-CM

## 2019-06-28 DIAGNOSIS — I25.118 CORONARY ARTERY DISEASE OF NATIVE ARTERY OF NATIVE HEART WITH STABLE ANGINA PECTORIS: Primary | ICD-10-CM

## 2019-06-28 PROCEDURE — 99213 PR OFFICE/OUTPT VISIT, EST, LEVL III, 20-29 MIN: ICD-10-PCS | Mod: S$GLB,,, | Performed by: INTERNAL MEDICINE

## 2019-06-28 PROCEDURE — 3075F SYST BP GE 130 - 139MM HG: CPT | Mod: CPTII,S$GLB,, | Performed by: INTERNAL MEDICINE

## 2019-06-28 PROCEDURE — 3079F PR MOST RECENT DIASTOLIC BLOOD PRESSURE 80-89 MM HG: ICD-10-PCS | Mod: CPTII,S$GLB,, | Performed by: INTERNAL MEDICINE

## 2019-06-28 PROCEDURE — 99213 OFFICE O/P EST LOW 20 MIN: CPT | Mod: S$GLB,,, | Performed by: INTERNAL MEDICINE

## 2019-06-28 PROCEDURE — 3079F DIAST BP 80-89 MM HG: CPT | Mod: CPTII,S$GLB,, | Performed by: INTERNAL MEDICINE

## 2019-06-28 PROCEDURE — 99999 PR PBB SHADOW E&M-EST. PATIENT-LVL III: ICD-10-PCS | Mod: PBBFAC,,, | Performed by: INTERNAL MEDICINE

## 2019-06-28 PROCEDURE — 3075F PR MOST RECENT SYSTOLIC BLOOD PRESS GE 130-139MM HG: ICD-10-PCS | Mod: CPTII,S$GLB,, | Performed by: INTERNAL MEDICINE

## 2019-06-28 PROCEDURE — 99999 PR PBB SHADOW E&M-EST. PATIENT-LVL III: CPT | Mod: PBBFAC,,, | Performed by: INTERNAL MEDICINE

## 2019-06-28 NOTE — PROGRESS NOTES
Subjective:    Patient ID:  Harry Phan is a 68 y.o. male who presents for evaluation of Follow-up (4 month follow up ); Hypertension; Hyperlipidemia; Coronary Artery Disease; and Risk Factor Management For Atherosclerosis      HPI pt presents for f/u.   His current medical conditions include CAD, HTN, dyslipidemia. Nonsmoker.   Past hx pertinent for following:  s/p NSTEMI 12/08 and was found to have a 100% chronic occluded mid-LCX artery and a 95% mid-RCA lesion (culprit vessel) and underwent successful PCI with a 4 x 23 mm Vision BMS. Medical mgt was advised for his occluded LCX. He also was noted to have nonobstructive LAD disease at the time.  Pt seen 8/18 for recurrent exertional angina and had [facility] next day with PCI performed of critical 95% prox LAD stenosis with Stent Resolute MELE x one,  LCX and patent RCA stent, normal EF.  Now here.  CAD seems stable. He is not having active anginal sxs on current tx.  No CHF sxs.  Lipids well controlled, on statin tx.  BP controlled on current meds.       Current Outpatient Medications:     aspirin (ECOTRIN) 81 MG EC tablet, Take 81 mg by mouth once daily., Disp: , Rfl:     atorvastatin (LIPITOR) 80 MG tablet, TAKE 1 TABLET EVERY EVENING, Disp: 90 tablet, Rfl: 3    isosorbide mononitrate (IMDUR) 30 MG 24 hr tablet, Take 1 tablet (30 mg total) by mouth once daily., Disp: 90 tablet, Rfl: 3    isosorbide mononitrate (IMDUR) 30 MG 24 hr tablet, TAKE 1 TABLET (30 MG TOTAL) BY MOUTH ONCE DAILY., Disp: 90 tablet, Rfl: 3    metoprolol tartrate (LOPRESSOR) 50 MG tablet, TAKE 1 TABLET TWICE DAILY, Disp: 180 tablet, Rfl: 3    niacin (NIASPAN EXTENDED-RELEASE) 750 MG CR tablet, Take 1 tablet (750 mg total) by mouth 2 (two) times daily., Disp: 180 tablet, Rfl: 3    nitroGLYCERIN (NITROSTAT) 0.4 MG SL tablet, Place 1 tablet (0.4 mg total) under the tongue every 5 (five) minutes as needed., Disp: 20 tablet, Rfl: 12    phenylephrine-acetaminophen 5-325 mg Cap, , Disp: ,  "Rfl:     prasugrel (EFFIENT) 10 mg Tab, TAKE 1 TABLET EVERY DAY, Disp: 90 tablet, Rfl: 3    ramipril (ALTACE) 2.5 MG capsule, Take 1 capsule (2.5 mg total) by mouth once daily., Disp: 90 capsule, Rfl: 3      Review of Systems   Constitution: Negative.   HENT: Negative.    Eyes: Negative.    Cardiovascular: Negative.    Respiratory: Negative.    Endocrine: Negative.    Hematologic/Lymphatic: Negative.    Skin: Negative.    Musculoskeletal: Negative.    Gastrointestinal: Negative.    Genitourinary: Negative.    Neurological: Negative.    Psychiatric/Behavioral: Negative.    Allergic/Immunologic: Negative.        /84 (BP Location: Left arm, Patient Position: Sitting, BP Method: Medium (Manual))   Pulse 65   Ht 5' 10" (1.778 m)   Wt 95.3 kg (210 lb 1.6 oz)   SpO2 95%   BMI 30.15 kg/m²   Wt Readings from Last 3 Encounters:   06/28/19 95.3 kg (210 lb 1.6 oz)   02/15/19 95.5 kg (210 lb 8.6 oz)   11/09/18 99 kg (218 lb 4.1 oz)     Temp Readings from Last 3 Encounters:   08/03/18 96.7 °F (35.9 °C) (Oral)     BP Readings from Last 3 Encounters:   06/28/19 130/84   02/15/19 128/76   11/09/18 (!) 144/84     Pulse Readings from Last 3 Encounters:   06/28/19 65   02/15/19 68   11/09/18 80          Objective:    Physical Exam   Constitutional: He is oriented to person, place, and time. He appears well-developed and well-nourished.   HENT:   Head: Normocephalic.   Neck: Normal range of motion. Neck supple. No JVD present. Carotid bruit is not present. No thyromegaly present.   Cardiovascular: Normal rate, regular rhythm, S1 normal and S2 normal. PMI is not displaced. Exam reveals no S3, no S4, no distant heart sounds, no friction rub, no midsystolic click and no opening snap.   No murmur heard.  Pulses:       Radial pulses are 2+ on the right side, and 2+ on the left side.   Pulmonary/Chest: Effort normal and breath sounds normal. He has no wheezes. He has no rales.   Abdominal: Soft. Bowel sounds are normal. He " exhibits no distension, no abdominal bruit and no mass. There is no tenderness.   Musculoskeletal: He exhibits no edema.   Neurological: He is alert and oriented to person, place, and time.   Skin: Skin is warm.   Psychiatric: He has a normal mood and affect. His behavior is normal.   Nursing note and vitals reviewed.      I have reviewed all pertinent labs and cardiac studies.      Chemistry        Component Value Date/Time     06/25/2019 1014    K 4.3 06/25/2019 1014     06/25/2019 1014    CO2 28 06/25/2019 1014    BUN 12 06/25/2019 1014    CREATININE 1.0 06/25/2019 1014    GLU 95 06/25/2019 1014        Component Value Date/Time    CALCIUM 10.2 06/25/2019 1014    ALKPHOS 72 06/25/2019 1014    AST 24 06/25/2019 1014    ALT 20 06/25/2019 1014    BILITOT 1.0 06/25/2019 1014    ESTGFRAFRICA >60.0 06/25/2019 1014    EGFRNONAA >60.0 06/25/2019 1014        Lab Results   Component Value Date    WBC 6.94 08/03/2018    HGB 13.6 (L) 08/03/2018    HCT 40.8 08/03/2018    MCV 92 08/03/2018     (L) 08/03/2018     Lab Results   Component Value Date    CHOL 109 (L) 06/25/2019    CHOL 106 (L) 11/02/2018    CHOL 117 (L) 04/25/2016     Lab Results   Component Value Date    HDL 40 06/25/2019    HDL 35 (L) 11/02/2018    HDL 33 (L) 04/25/2016     Lab Results   Component Value Date    LDLCALC 58.0 (L) 06/25/2019    LDLCALC 55.4 (L) 11/02/2018    LDLCALC 69.4 04/25/2016     Lab Results   Component Value Date    TRIG 55 06/25/2019    TRIG 78 11/02/2018    TRIG 73 04/25/2016     Lab Results   Component Value Date    CHOLHDL 36.7 06/25/2019    CHOLHDL 33.0 11/02/2018    CHOLHDL 28.2 04/25/2016           Assessment:       1. Coronary artery disease of native artery of native heart with stable angina pectoris    2. History of PTCA    3. Essential hypertension    4. Mixed hyperlipidemia    5. Old MI (myocardial infarction)    6. Status post insertion of drug-eluting stent into left anterior descending (LAD) artery          Plan:             Stable CV conditions on current medical tx.  Cardiac diet.  Daily exercise.  F/u 4 months.

## 2019-09-04 DIAGNOSIS — I10 ESSENTIAL HYPERTENSION: ICD-10-CM

## 2019-09-04 RX ORDER — RAMIPRIL 2.5 MG/1
CAPSULE ORAL
Qty: 90 CAPSULE | Refills: 3 | Status: SHIPPED | OUTPATIENT
Start: 2019-09-04 | End: 2020-07-13

## 2019-09-13 DIAGNOSIS — I10 ESSENTIAL HYPERTENSION: Primary | ICD-10-CM

## 2019-09-30 DIAGNOSIS — I10 ESSENTIAL HYPERTENSION: ICD-10-CM

## 2019-09-30 DIAGNOSIS — I25.10 CORONARY ARTERY DISEASE, ANGINA PRESENCE UNSPECIFIED, UNSPECIFIED VESSEL OR LESION TYPE, UNSPECIFIED WHETHER NATIVE OR TRANSPLANTED HEART: ICD-10-CM

## 2019-09-30 RX ORDER — METOPROLOL TARTRATE 50 MG/1
TABLET ORAL
Qty: 180 TABLET | Refills: 3 | Status: SHIPPED | OUTPATIENT
Start: 2019-09-30 | End: 2020-09-01

## 2019-10-18 ENCOUNTER — CLINICAL SUPPORT (OUTPATIENT)
Dept: CARDIOLOGY | Facility: CLINIC | Age: 69
End: 2019-10-18
Payer: MEDICARE

## 2019-10-18 ENCOUNTER — OFFICE VISIT (OUTPATIENT)
Dept: CARDIOLOGY | Facility: CLINIC | Age: 69
End: 2019-10-18
Payer: MEDICARE

## 2019-10-18 VITALS
WEIGHT: 214.5 LBS | DIASTOLIC BLOOD PRESSURE: 78 MMHG | SYSTOLIC BLOOD PRESSURE: 128 MMHG | HEART RATE: 73 BPM | BODY MASS INDEX: 30.78 KG/M2 | OXYGEN SATURATION: 98 %

## 2019-10-18 DIAGNOSIS — Z98.61 HISTORY OF PTCA: ICD-10-CM

## 2019-10-18 DIAGNOSIS — Z95.5 STATUS POST INSERTION OF DRUG-ELUTING STENT INTO LEFT ANTERIOR DESCENDING (LAD) ARTERY: ICD-10-CM

## 2019-10-18 DIAGNOSIS — I10 ESSENTIAL HYPERTENSION: ICD-10-CM

## 2019-10-18 DIAGNOSIS — E78.2 MIXED HYPERLIPIDEMIA: ICD-10-CM

## 2019-10-18 DIAGNOSIS — I25.118 CORONARY ARTERY DISEASE OF NATIVE ARTERY OF NATIVE HEART WITH STABLE ANGINA PECTORIS: Primary | ICD-10-CM

## 2019-10-18 DIAGNOSIS — I25.2 OLD MI (MYOCARDIAL INFARCTION): ICD-10-CM

## 2019-10-18 PROCEDURE — 3078F DIAST BP <80 MM HG: CPT | Mod: CPTII,S$GLB,, | Performed by: PHYSICIAN ASSISTANT

## 2019-10-18 PROCEDURE — 93005 EKG 12-LEAD: ICD-10-PCS | Mod: S$GLB,,, | Performed by: INTERNAL MEDICINE

## 2019-10-18 PROCEDURE — 3074F SYST BP LT 130 MM HG: CPT | Mod: CPTII,S$GLB,, | Performed by: PHYSICIAN ASSISTANT

## 2019-10-18 PROCEDURE — 3074F PR MOST RECENT SYSTOLIC BLOOD PRESSURE < 130 MM HG: ICD-10-PCS | Mod: CPTII,S$GLB,, | Performed by: PHYSICIAN ASSISTANT

## 2019-10-18 PROCEDURE — 99999 PR PBB SHADOW E&M-EST. PATIENT-LVL III: ICD-10-PCS | Mod: PBBFAC,,, | Performed by: PHYSICIAN ASSISTANT

## 2019-10-18 PROCEDURE — 99214 OFFICE O/P EST MOD 30 MIN: CPT | Mod: S$GLB,,, | Performed by: PHYSICIAN ASSISTANT

## 2019-10-18 PROCEDURE — 93005 ELECTROCARDIOGRAM TRACING: CPT | Mod: S$GLB,,, | Performed by: INTERNAL MEDICINE

## 2019-10-18 PROCEDURE — 99214 PR OFFICE/OUTPT VISIT, EST, LEVL IV, 30-39 MIN: ICD-10-PCS | Mod: S$GLB,,, | Performed by: PHYSICIAN ASSISTANT

## 2019-10-18 PROCEDURE — 99999 PR PBB SHADOW E&M-EST. PATIENT-LVL III: CPT | Mod: PBBFAC,,, | Performed by: PHYSICIAN ASSISTANT

## 2019-10-18 PROCEDURE — 3078F PR MOST RECENT DIASTOLIC BLOOD PRESSURE < 80 MM HG: ICD-10-PCS | Mod: CPTII,S$GLB,, | Performed by: PHYSICIAN ASSISTANT

## 2019-10-18 PROCEDURE — 93010 EKG 12-LEAD: ICD-10-PCS | Mod: S$GLB,,, | Performed by: INTERNAL MEDICINE

## 2019-10-18 PROCEDURE — 93010 ELECTROCARDIOGRAM REPORT: CPT | Mod: S$GLB,,, | Performed by: INTERNAL MEDICINE

## 2019-10-18 NOTE — PROGRESS NOTES
Subjective:    Patient ID:  Harry Phan is a 68 y.o. male who presents for follow-up of CAD, HTN, hyperlipidemia      HPI   Mr. Phan is a 68 year old male patient whose current medical conditions include CAD s/p (NSTEMI in 12/8 with occluded LCX noted, s/p PCI of RCA, and repeat LHC in 8/18 s/p PCI proximal LAD, HTN, and dyslipidemia who presents today for follow-up. He returns today and states he is doing well. Main complaint today is left knee pain, with a fluid filled lump. Other than that issue, patient remains stable. No cardiac complaints. Denies any chest pain, SOB, or other anginal signs or symptoms. No lightheadedness, dizziness, palpitations, near syncope, or syncope. No s/s of CHF. BP controlled. Patient reports compliance with his medications. Fairly active, working on renovating a house. Last labs reviewed, stable. EKG today shows NSR, normal EKG.      Review of Systems   Constitution: Negative for chills, decreased appetite, fever and malaise/fatigue.   HENT: Negative for congestion, hoarse voice and sore throat.    Eyes: Negative for blurred vision and discharge.   Cardiovascular: Negative for chest pain, claudication, cyanosis, dyspnea on exertion, irregular heartbeat, leg swelling, near-syncope, orthopnea, palpitations and paroxysmal nocturnal dyspnea.   Respiratory: Negative for cough, hemoptysis, shortness of breath, snoring, sputum production and wheezing.    Endocrine: Negative for cold intolerance and heat intolerance.   Hematologic/Lymphatic: Negative for bleeding problem. Does not bruise/bleed easily.   Skin: Negative for rash.   Musculoskeletal: Positive for joint pain. Negative for arthritis, back pain, joint swelling, muscle cramps, muscle weakness and myalgias.   Gastrointestinal: Negative for abdominal pain, constipation, diarrhea, heartburn, melena and nausea.   Genitourinary: Negative for hematuria.   Neurological: Negative for dizziness, focal weakness, headaches,  light-headedness, loss of balance, numbness, paresthesias, seizures and weakness.   Psychiatric/Behavioral: Negative for memory loss. The patient does not have insomnia.    Allergic/Immunologic: Negative for hives.     /78 (BP Location: Left arm, Patient Position: Sitting)   Pulse 73   Wt 97.3 kg (214 lb 8.1 oz)   SpO2 98%   BMI 30.78 kg/m²   Objective:    Physical Exam   Constitutional: He is oriented to person, place, and time. He appears well-developed and well-nourished. No distress.   HENT:   Head: Normocephalic and atraumatic.   Eyes: Pupils are equal, round, and reactive to light. Right eye exhibits no discharge. Left eye exhibits no discharge.   Neck: Neck supple. No JVD present. No tracheal deviation present. No thyromegaly present.   Cardiovascular: Normal rate, regular rhythm, normal heart sounds and intact distal pulses. PMI is not displaced. Exam reveals no gallop, no S3, no S4 and no friction rub.   No murmur heard.  Pulmonary/Chest: Effort normal and breath sounds normal. No respiratory distress. He has no wheezes. He has no rales.   Abdominal: He exhibits no distension. There is no tenderness. There is no rebound.   Musculoskeletal: He exhibits no edema.   Neurological: He is alert and oriented to person, place, and time.   Skin: Skin is warm and dry. He is not diaphoretic. No erythema.   Psychiatric: He has a normal mood and affect. His behavior is normal.     Lab Results   Component Value Date    CHOL 109 (L) 06/25/2019    CHOL 106 (L) 11/02/2018    CHOL 117 (L) 04/25/2016     Lab Results   Component Value Date    HDL 40 06/25/2019    HDL 35 (L) 11/02/2018    HDL 33 (L) 04/25/2016     Lab Results   Component Value Date    LDLCALC 58.0 (L) 06/25/2019    LDLCALC 55.4 (L) 11/02/2018    LDLCALC 69.4 04/25/2016     Lab Results   Component Value Date    TRIG 55 06/25/2019    TRIG 78 11/02/2018    TRIG 73 04/25/2016     Lab Results   Component Value Date    CHOLHDL 36.7 06/25/2019    CHOLHDL  33.0 11/02/2018    CHOLHDL 28.2 04/25/2016       Chemistry        Component Value Date/Time     06/25/2019 1014    K 4.3 06/25/2019 1014     06/25/2019 1014    CO2 28 06/25/2019 1014    BUN 12 06/25/2019 1014    CREATININE 1.0 06/25/2019 1014    GLU 95 06/25/2019 1014        Component Value Date/Time    CALCIUM 10.2 06/25/2019 1014    ALKPHOS 72 06/25/2019 1014    AST 24 06/25/2019 1014    ALT 20 06/25/2019 1014    BILITOT 1.0 06/25/2019 1014    ESTGFRAFRICA >60.0 06/25/2019 1014    EGFRNONAA >60.0 06/25/2019 1014        No results found for: LABA1C, HGBA1C    E. Angiographic Results     Diagnostic:          Patient has a right dominant coronary artery.        - Left Main Coronary Artery:             The LM is normal. There is LENARD 3 flow.       - Left Anterior Descending Artery:             The proximal LAD has a 95% stenosis. There is LENARD 3 flow.                     Lesion Details:   This is a Type B lesion.  The length is 12 mm, concentric shape, mild proximal tortuosity, segment angulation of <45 degrees, none to mild calcification. No bifurcation is present.             The mid LAD has a 20 - 30% stenosis. There is LENARD 3 flow.             The distal LAD has a 30 - 40% stenosis. There is LENARD 3 flow.       - D1:             The D1 is normal. There is LENARD 3 flow.     - D2:             The D2 is normal. There is LENARD 3 flow.       - Left Circumflex Artery:             The proximal LCX has chronic total occlusion. There are collaterals to LCX system from RCA.     - OM1:             The OM1 is normal. There is LENARD 3 flow.     - Ramus:             The ostial ramus has a 20% stenosis. There is LENARD 3 flow. The remaining portion of the vessel is normal.       - Right Coronary Artery:             The RCA. There is LENARD 3 flow. RCA has mild nonobstructive disease.  Widely patent stent in mid segment.  RCA provides collaterals to LCX system.       Intervention          Proximal LAD:              The lesion  was successfully intervened. Post-stenosis of 0% and post-LENARD 3 flow. The vessel was accessed natively.  The following items were used: Blln Sc Euphora 3.0 X 12, Stent Resolute Rx 3.50x15 (MELE) and Blln Quantum 4.0 X 8.  Assessment:       1. Coronary artery disease of native artery of native heart with stable angina pectoris    2. History of PTCA    3. Essential hypertension    4. Old MI (myocardial infarction)    5. Mixed hyperlipidemia    6. Status post insertion of drug-eluting stent into left anterior descending (LAD) artery      Doing clinically well. Stable CV wise. No angina/equivalent. No s/s of CHF. BP and lipids controlled. Continue same medical management.    Plan:   -Continue current medical management and risk factor modification  -Cardiac, low salt diet  -Continue active lifestyle  -RTC 4 months with lipid, cmp with Dr. Sen

## 2020-01-17 DIAGNOSIS — E78.2 MIXED HYPERLIPIDEMIA: ICD-10-CM

## 2020-01-17 DIAGNOSIS — I25.10 CORONARY ARTERY DISEASE, ANGINA PRESENCE UNSPECIFIED, UNSPECIFIED VESSEL OR LESION TYPE, UNSPECIFIED WHETHER NATIVE OR TRANSPLANTED HEART: ICD-10-CM

## 2020-01-17 RX ORDER — ATORVASTATIN CALCIUM 80 MG/1
TABLET, FILM COATED ORAL
Qty: 90 TABLET | Refills: 3 | Status: SHIPPED | OUTPATIENT
Start: 2020-01-17 | End: 2020-12-02

## 2020-01-17 RX ORDER — ISOSORBIDE MONONITRATE 30 MG/1
TABLET, EXTENDED RELEASE ORAL
Qty: 90 TABLET | Refills: 3 | Status: SHIPPED | OUTPATIENT
Start: 2020-01-17 | End: 2020-12-07

## 2020-02-13 ENCOUNTER — LAB VISIT (OUTPATIENT)
Dept: LAB | Facility: HOSPITAL | Age: 70
End: 2020-02-13
Attending: FAMILY MEDICINE
Payer: MEDICARE

## 2020-02-13 DIAGNOSIS — Z95.5 STATUS POST INSERTION OF DRUG-ELUTING STENT INTO LEFT ANTERIOR DESCENDING (LAD) ARTERY: ICD-10-CM

## 2020-02-13 DIAGNOSIS — I25.2 OLD MI (MYOCARDIAL INFARCTION): ICD-10-CM

## 2020-02-13 DIAGNOSIS — E78.2 MIXED HYPERLIPIDEMIA: ICD-10-CM

## 2020-02-13 DIAGNOSIS — I25.118 CORONARY ARTERY DISEASE OF NATIVE ARTERY OF NATIVE HEART WITH STABLE ANGINA PECTORIS: ICD-10-CM

## 2020-02-13 DIAGNOSIS — Z98.61 HISTORY OF PTCA: ICD-10-CM

## 2020-02-13 PROCEDURE — 80053 COMPREHEN METABOLIC PANEL: CPT

## 2020-02-13 PROCEDURE — 80061 LIPID PANEL: CPT

## 2020-02-13 PROCEDURE — 36415 COLL VENOUS BLD VENIPUNCTURE: CPT

## 2020-02-14 LAB
ALBUMIN SERPL BCP-MCNC: 3.9 G/DL (ref 3.5–5.2)
ALP SERPL-CCNC: 71 U/L (ref 55–135)
ALT SERPL W/O P-5'-P-CCNC: 19 U/L (ref 10–44)
ANION GAP SERPL CALC-SCNC: 10 MMOL/L (ref 8–16)
AST SERPL-CCNC: 25 U/L (ref 10–40)
BILIRUB SERPL-MCNC: 1.2 MG/DL (ref 0.1–1)
BUN SERPL-MCNC: 13 MG/DL (ref 8–23)
CALCIUM SERPL-MCNC: 9.4 MG/DL (ref 8.7–10.5)
CHLORIDE SERPL-SCNC: 108 MMOL/L (ref 95–110)
CHOLEST SERPL-MCNC: 103 MG/DL (ref 120–199)
CHOLEST/HDLC SERPL: 2.7 {RATIO} (ref 2–5)
CO2 SERPL-SCNC: 23 MMOL/L (ref 23–29)
CREAT SERPL-MCNC: 1 MG/DL (ref 0.5–1.4)
EST. GFR  (AFRICAN AMERICAN): >60 ML/MIN/1.73 M^2
EST. GFR  (NON AFRICAN AMERICAN): >60 ML/MIN/1.73 M^2
GLUCOSE SERPL-MCNC: 84 MG/DL (ref 70–110)
HDLC SERPL-MCNC: 38 MG/DL (ref 40–75)
HDLC SERPL: 36.9 % (ref 20–50)
LDLC SERPL CALC-MCNC: 53.8 MG/DL (ref 63–159)
NONHDLC SERPL-MCNC: 65 MG/DL
POTASSIUM SERPL-SCNC: 4.7 MMOL/L (ref 3.5–5.1)
PROT SERPL-MCNC: 6.8 G/DL (ref 6–8.4)
SODIUM SERPL-SCNC: 141 MMOL/L (ref 136–145)
TRIGL SERPL-MCNC: 56 MG/DL (ref 30–150)

## 2020-02-16 ENCOUNTER — TELEPHONE (OUTPATIENT)
Dept: CARDIOLOGY | Facility: HOSPITAL | Age: 70
End: 2020-02-16

## 2020-02-16 NOTE — TELEPHONE ENCOUNTER
Please phone patient. Labs reviewed. CMP stable. Lipids at goal/controlled. Continue same mgmt    Follow-up as scheduled with Dr. Sen    Thanks

## 2020-02-17 NOTE — TELEPHONE ENCOUNTER
I left a voicemail for Mr Mcdonald to call the office to review his lab results and remind him to keep his follow up appt with Dr Sen

## 2020-02-19 ENCOUNTER — OFFICE VISIT (OUTPATIENT)
Dept: CARDIOLOGY | Facility: CLINIC | Age: 70
End: 2020-02-19
Payer: MEDICARE

## 2020-02-19 VITALS
HEIGHT: 70 IN | SYSTOLIC BLOOD PRESSURE: 128 MMHG | WEIGHT: 217.5 LBS | OXYGEN SATURATION: 98 % | DIASTOLIC BLOOD PRESSURE: 80 MMHG | HEART RATE: 86 BPM | BODY MASS INDEX: 31.14 KG/M2

## 2020-02-19 DIAGNOSIS — I20.9 AP (ANGINA PECTORIS): ICD-10-CM

## 2020-02-19 DIAGNOSIS — I25.118 CORONARY ARTERY DISEASE OF NATIVE ARTERY OF NATIVE HEART WITH STABLE ANGINA PECTORIS: ICD-10-CM

## 2020-02-19 DIAGNOSIS — E66.3 OVERWEIGHT: ICD-10-CM

## 2020-02-19 DIAGNOSIS — I25.2 OLD MI (MYOCARDIAL INFARCTION): ICD-10-CM

## 2020-02-19 DIAGNOSIS — Z98.61 HISTORY OF PTCA: ICD-10-CM

## 2020-02-19 DIAGNOSIS — E78.2 MIXED HYPERLIPIDEMIA: ICD-10-CM

## 2020-02-19 DIAGNOSIS — I25.10 CORONARY ARTERY DISEASE, ANGINA PRESENCE UNSPECIFIED, UNSPECIFIED VESSEL OR LESION TYPE, UNSPECIFIED WHETHER NATIVE OR TRANSPLANTED HEART: Primary | ICD-10-CM

## 2020-02-19 DIAGNOSIS — Z95.5 STATUS POST INSERTION OF DRUG-ELUTING STENT INTO LEFT ANTERIOR DESCENDING (LAD) ARTERY: ICD-10-CM

## 2020-02-19 DIAGNOSIS — I10 ESSENTIAL HYPERTENSION: ICD-10-CM

## 2020-02-19 PROCEDURE — 1159F PR MEDICATION LIST DOCUMENTED IN MEDICAL RECORD: ICD-10-PCS | Mod: S$GLB,,, | Performed by: INTERNAL MEDICINE

## 2020-02-19 PROCEDURE — 99214 PR OFFICE/OUTPT VISIT, EST, LEVL IV, 30-39 MIN: ICD-10-PCS | Mod: S$GLB,,, | Performed by: INTERNAL MEDICINE

## 2020-02-19 PROCEDURE — 3074F SYST BP LT 130 MM HG: CPT | Mod: CPTII,S$GLB,, | Performed by: INTERNAL MEDICINE

## 2020-02-19 PROCEDURE — 3079F DIAST BP 80-89 MM HG: CPT | Mod: CPTII,S$GLB,, | Performed by: INTERNAL MEDICINE

## 2020-02-19 PROCEDURE — 3079F PR MOST RECENT DIASTOLIC BLOOD PRESSURE 80-89 MM HG: ICD-10-PCS | Mod: CPTII,S$GLB,, | Performed by: INTERNAL MEDICINE

## 2020-02-19 PROCEDURE — 3074F PR MOST RECENT SYSTOLIC BLOOD PRESSURE < 130 MM HG: ICD-10-PCS | Mod: CPTII,S$GLB,, | Performed by: INTERNAL MEDICINE

## 2020-02-19 PROCEDURE — 99999 PR PBB SHADOW E&M-EST. PATIENT-LVL III: CPT | Mod: PBBFAC,,, | Performed by: INTERNAL MEDICINE

## 2020-02-19 PROCEDURE — 1159F MED LIST DOCD IN RCRD: CPT | Mod: S$GLB,,, | Performed by: INTERNAL MEDICINE

## 2020-02-19 PROCEDURE — 99214 OFFICE O/P EST MOD 30 MIN: CPT | Mod: S$GLB,,, | Performed by: INTERNAL MEDICINE

## 2020-02-19 PROCEDURE — 99999 PR PBB SHADOW E&M-EST. PATIENT-LVL III: ICD-10-PCS | Mod: PBBFAC,,, | Performed by: INTERNAL MEDICINE

## 2020-02-19 RX ORDER — NITROGLYCERIN 0.4 MG/1
0.4 TABLET SUBLINGUAL EVERY 5 MIN PRN
Qty: 20 TABLET | Refills: 12 | Status: SHIPPED | OUTPATIENT
Start: 2020-02-19 | End: 2021-10-08

## 2020-02-19 RX ORDER — PRASUGREL 5 MG/1
5 TABLET, FILM COATED ORAL DAILY
Qty: 90 TABLET | Refills: 4 | Status: SHIPPED | OUTPATIENT
Start: 2020-02-19 | End: 2021-02-21

## 2020-02-19 NOTE — PROGRESS NOTES
Subjective:    Patient ID:  Harry Phan is a 69 y.o. male who presents for evaluation of Hypertension; Hyperlipidemia; Coronary Artery Disease; and Risk Factor Management For Atherosclerosis      HPI pt presents for f/u.   His current medical conditions include CAD, HTN, dyslipidemia. Nonsmoker.   Past hx pertinent for following:  s/p NSTEMI 12/08 and was found to have a 100% chronic occluded mid-LCX artery and a 95% mid-RCA lesion (culprit vessel) and underwent successful PCI with a 4 x 23 mm Vision BMS. Medical mgt was advised for his occluded LCX. He also was noted to have nonobstructive LAD disease at the time.  Pt seen 8/18 for recurrent exertional angina and had C next day with PCI performed of critical 95% prox LAD stenosis with Stent Resolute MELE x one,  LCX and patent RCA stent, normal EF.  Now here.   CAD is stable. He is not having active anginal sxs.  Denies chest pain sxs.   Has not used sl ntg.  Limited on exercise due to arthritic knee pain issues.  HTN well controlled on current meds.  No associated sxs.  Lipids well controlled, LDL < 60, on statin. HDL 38.  Compliant with meds.  Mindful of diet.  Weight up 5 - 10 pounds since last visit.       Current Outpatient Medications:     aspirin (ECOTRIN) 81 MG EC tablet, Take 81 mg by mouth once daily., Disp: , Rfl:     atorvastatin (LIPITOR) 80 MG tablet, TAKE 1 TABLET EVERY EVENING, Disp: 90 tablet, Rfl: 3    isosorbide mononitrate (IMDUR) 30 MG 24 hr tablet, TAKE 1 TABLET EVERY DAY, Disp: 90 tablet, Rfl: 3    metoprolol tartrate (LOPRESSOR) 50 MG tablet, TAKE 1 TABLET TWICE DAILY, Disp: 180 tablet, Rfl: 3    niacin (NIASPAN EXTENDED-RELEASE) 750 MG CR tablet, Take 1 tablet (750 mg total) by mouth 2 (two) times daily., Disp: 180 tablet, Rfl: 3    nitroGLYCERIN (NITROSTAT) 0.4 MG SL tablet, Place 1 tablet (0.4 mg total) under the tongue every 5 (five) minutes as needed., Disp: 20 tablet, Rfl: 12    phenylephrine-acetaminophen 5-325 mg  "Cap, , Disp: , Rfl:     prasugrel (EFFIENT) 10 mg Tab, TAKE 1 TABLET EVERY DAY, Disp: 90 tablet, Rfl: 3    ramipril (ALTACE) 2.5 MG capsule, TAKE 1 CAPSULE EVERY DAY, Disp: 90 capsule, Rfl: 3      Review of Systems   Constitution: Positive for weight gain.   HENT: Negative.    Eyes: Negative.    Cardiovascular: Negative.    Respiratory: Negative.    Endocrine: Negative.    Hematologic/Lymphatic: Negative.    Skin: Negative.    Musculoskeletal: Positive for arthritis and joint pain.   Gastrointestinal: Negative.    Genitourinary: Negative.    Neurological: Negative.    Psychiatric/Behavioral: Negative.    Allergic/Immunologic: Negative.        /80 (BP Location: Left arm, Patient Position: Sitting, BP Method: Large (Manual))   Pulse 86   Ht 5' 10" (1.778 m)   Wt 98.6 kg (217 lb 7.7 oz)   SpO2 98%   BMI 31.21 kg/m²     Wt Readings from Last 3 Encounters:   02/19/20 98.6 kg (217 lb 7.7 oz)   10/18/19 97.3 kg (214 lb 8.1 oz)   06/28/19 95.3 kg (210 lb 1.6 oz)     Temp Readings from Last 3 Encounters:   08/03/18 96.7 °F (35.9 °C) (Oral)     BP Readings from Last 3 Encounters:   02/19/20 128/80   10/18/19 128/78   06/28/19 130/84     Pulse Readings from Last 3 Encounters:   02/19/20 86   10/18/19 73   06/28/19 65            Objective:    Physical Exam   Constitutional: He is oriented to person, place, and time. He appears well-developed and well-nourished.   HENT:   Head: Normocephalic.   Neck: Normal range of motion. Neck supple. Normal carotid pulses, no hepatojugular reflux and no JVD present. Carotid bruit is not present. No thyromegaly present.   Cardiovascular: Normal rate, regular rhythm, S1 normal and S2 normal. PMI is not displaced. Exam reveals no S3, no S4, no distant heart sounds, no friction rub, no midsystolic click and no opening snap.   No murmur heard.  Pulses:       Radial pulses are 2+ on the right side, and 2+ on the left side.   Pulmonary/Chest: Effort normal and breath sounds normal. He " has no wheezes. He has no rales.   Abdominal: Soft. Bowel sounds are normal. He exhibits no distension, no abdominal bruit, no ascites and no mass. There is no tenderness.   Musculoskeletal: He exhibits no edema.   Neurological: He is alert and oriented to person, place, and time.   Skin: Skin is warm.   Psychiatric: He has a normal mood and affect. His behavior is normal.   Nursing note and vitals reviewed.      I have reviewed all pertinent labs and cardiac studies.      Chemistry        Component Value Date/Time     02/13/2020 0932    K 4.7 02/13/2020 0932     02/13/2020 0932    CO2 23 02/13/2020 0932    BUN 13 02/13/2020 0932    CREATININE 1.0 02/13/2020 0932    GLU 84 02/13/2020 0932        Component Value Date/Time    CALCIUM 9.4 02/13/2020 0932    ALKPHOS 71 02/13/2020 0932    AST 25 02/13/2020 0932    ALT 19 02/13/2020 0932    BILITOT 1.2 (H) 02/13/2020 0932    ESTGFRAFRICA >60.0 02/13/2020 0932    EGFRNONAA >60.0 02/13/2020 0932          Lab Results   Component Value Date    CHOL 103 (L) 02/13/2020    CHOL 109 (L) 06/25/2019    CHOL 106 (L) 11/02/2018     Lab Results   Component Value Date    HDL 38 (L) 02/13/2020    HDL 40 06/25/2019    HDL 35 (L) 11/02/2018     Lab Results   Component Value Date    LDLCALC 53.8 (L) 02/13/2020    LDLCALC 58.0 (L) 06/25/2019    LDLCALC 55.4 (L) 11/02/2018     Lab Results   Component Value Date    TRIG 56 02/13/2020    TRIG 55 06/25/2019    TRIG 78 11/02/2018     Lab Results   Component Value Date    CHOLHDL 36.9 02/13/2020    CHOLHDL 36.7 06/25/2019    CHOLHDL 33.0 11/02/2018           Assessment:       1. Coronary artery disease, angina presence unspecified, unspecified vessel or lesion type, unspecified whether native or transplanted heart    2. AP (angina pectoris)    3. Coronary artery disease of native artery of native heart with stable angina pectoris    4. History of PTCA    5. Essential hypertension    6. Mixed hyperlipidemia    7. Old MI (myocardial  infarction)    8. Status post insertion of drug-eluting stent into left anterior descending (LAD) artery    9. Overweight         Plan:             Stable cardiovascular conditions at present time on current medical treatment.  Reviewed all tests and above medical conditions with patient in detail and formulated treatment plan.  Continue optimal medical treatment for cardiovascular conditions.  Cardiac low salt diet discussed.  Daily exercise encouraged, goal 30 +  minutes aerobic exercise as tolerated.  Maintaining healthy weight and weight loss goals (if needed) were discussed in clinic.  Needs to lose weight.  Has arthritic issues in his knee and will see orthopedic surgery again.  Discussed with pt on DAPT, pros/cons on staying on DAPT > 1 year for CV protection, risks of bleeding.  Will likely cut his Effient from 10 mg qd to 5 mg qd.  F/u in 6 months with stress test and echo.

## 2020-09-30 ENCOUNTER — HOSPITAL ENCOUNTER (OUTPATIENT)
Dept: RADIOLOGY | Facility: HOSPITAL | Age: 70
Discharge: HOME | End: 2020-09-30
Attending: INTERNAL MEDICINE
Payer: MEDICARE

## 2020-09-30 ENCOUNTER — HOSPITAL ENCOUNTER (OUTPATIENT)
Dept: CARDIOLOGY | Facility: HOSPITAL | Age: 70
Discharge: HOME | End: 2020-09-30
Attending: INTERNAL MEDICINE
Payer: MEDICARE

## 2020-09-30 VITALS — WEIGHT: 217 LBS | HEIGHT: 70 IN | BODY MASS INDEX: 31.07 KG/M2

## 2020-09-30 DIAGNOSIS — I25.2 OLD MI (MYOCARDIAL INFARCTION): ICD-10-CM

## 2020-09-30 DIAGNOSIS — I20.9 AP (ANGINA PECTORIS): ICD-10-CM

## 2020-09-30 DIAGNOSIS — I25.10 CORONARY ARTERY DISEASE, ANGINA PRESENCE UNSPECIFIED, UNSPECIFIED VESSEL OR LESION TYPE, UNSPECIFIED WHETHER NATIVE OR TRANSPLANTED HEART: ICD-10-CM

## 2020-09-30 DIAGNOSIS — Z95.5 STATUS POST INSERTION OF DRUG-ELUTING STENT INTO LEFT ANTERIOR DESCENDING (LAD) ARTERY: ICD-10-CM

## 2020-09-30 DIAGNOSIS — E78.2 MIXED HYPERLIPIDEMIA: ICD-10-CM

## 2020-09-30 DIAGNOSIS — I25.118 CORONARY ARTERY DISEASE OF NATIVE ARTERY OF NATIVE HEART WITH STABLE ANGINA PECTORIS: ICD-10-CM

## 2020-09-30 DIAGNOSIS — E66.3 OVERWEIGHT: ICD-10-CM

## 2020-09-30 DIAGNOSIS — I10 ESSENTIAL HYPERTENSION: ICD-10-CM

## 2020-09-30 DIAGNOSIS — Z98.61 HISTORY OF PTCA: ICD-10-CM

## 2020-09-30 PROCEDURE — 93306 TTE W/DOPPLER COMPLETE: CPT | Mod: 26,,, | Performed by: INTERNAL MEDICINE

## 2020-09-30 PROCEDURE — 78452 STRESS TEST WITH MYOCARDIAL PERFUSION (CUPID ONLY): ICD-10-PCS | Mod: 26,,, | Performed by: INTERNAL MEDICINE

## 2020-09-30 PROCEDURE — 93016 CV STRESS TEST SUPVJ ONLY: CPT | Mod: ,,, | Performed by: INTERNAL MEDICINE

## 2020-09-30 PROCEDURE — 78452 HT MUSCLE IMAGE SPECT MULT: CPT | Mod: 26,,, | Performed by: INTERNAL MEDICINE

## 2020-09-30 PROCEDURE — 93306 TTE W/DOPPLER COMPLETE: CPT

## 2020-09-30 PROCEDURE — 93018 STRESS TEST WITH MYOCARDIAL PERFUSION (CUPID ONLY): ICD-10-PCS | Mod: ,,, | Performed by: INTERNAL MEDICINE

## 2020-09-30 PROCEDURE — 93016 STRESS TEST WITH MYOCARDIAL PERFUSION (CUPID ONLY): ICD-10-PCS | Mod: ,,, | Performed by: INTERNAL MEDICINE

## 2020-09-30 PROCEDURE — 93306 ECHO (CUPID ONLY): ICD-10-PCS | Mod: 26,,, | Performed by: INTERNAL MEDICINE

## 2020-09-30 PROCEDURE — 93017 CV STRESS TEST TRACING ONLY: CPT

## 2020-09-30 PROCEDURE — 93018 CV STRESS TEST I&R ONLY: CPT | Mod: ,,, | Performed by: INTERNAL MEDICINE

## 2020-09-30 PROCEDURE — A9502 TC99M TETROFOSMIN: HCPCS

## 2020-09-30 RX ORDER — REGADENOSON 0.08 MG/ML
0.4 INJECTION, SOLUTION INTRAVENOUS ONCE
Status: COMPLETED | OUTPATIENT
Start: 2020-09-30 | End: 2020-09-30

## 2020-09-30 RX ADMIN — REGADENOSON 0.4 MG: 0.08 INJECTION, SOLUTION INTRAVENOUS at 12:09

## 2020-10-01 LAB
CV STRESS BASE HR: 66 BPM
DIASTOLIC BLOOD PRESSURE: 85 MMHG
NUC REST EJECTION FRACTION: 73
NUC STRESS EJECTION FRACTION: 66 %
OHS CV CPX 85 PERCENT MAX PREDICTED HEART RATE MALE: 130
OHS CV CPX ESTIMATED METS: 1
OHS CV CPX MAX PREDICTED HEART RATE: 153
OHS CV CPX PATIENT IS FEMALE: 0
OHS CV CPX PATIENT IS MALE: 1
OHS CV CPX PEAK DIASTOLIC BLOOD PRESSURE: 77 MMHG
OHS CV CPX PEAK HEAR RATE: 92 BPM
OHS CV CPX PEAK RATE PRESSURE PRODUCT: NORMAL
OHS CV CPX PEAK SYSTOLIC BLOOD PRESSURE: 138 MMHG
OHS CV CPX PERCENT MAX PREDICTED HEART RATE ACHIEVED: 60
OHS CV CPX RATE PRESSURE PRODUCT PRESENTING: NORMAL
STRESS ECHO POST EXERCISE DUR SEC: 44 SECONDS
SYSTOLIC BLOOD PRESSURE: 152 MMHG

## 2020-10-06 LAB
AORTIC ROOT ANNULUS: 3.25 CM
AV INDEX (PROSTH): 0.69
AV MEAN GRADIENT: 4 MMHG
AV PEAK GRADIENT: 7 MMHG
AV VALVE AREA: 2.18 CM2
AV VELOCITY RATIO: 0.75
BSA FOR ECHO PROCEDURE: 2.2 M2
CV ECHO LV RWT: 0.54 CM
DOP CALC AO PEAK VEL: 1.35 M/S
DOP CALC AO VTI: 32.97 CM
DOP CALC LVOT AREA: 3.2 CM2
DOP CALC LVOT DIAMETER: 2.01 CM
DOP CALC LVOT PEAK VEL: 1.01 M/S
DOP CALC LVOT STROKE VOLUME: 71.99 CM3
DOP CALC RVOT PEAK VEL: 0.75 M/S
DOP CALC RVOT VTI: 17.59 CM
DOP CALCLVOT PEAK VEL VTI: 22.7 CM
E WAVE DECELERATION TIME: 205.73 MSEC
E/A RATIO: 1.04
E/E' RATIO: 9.56 M/S
ECHO LV POSTERIOR WALL: 1.23 CM (ref 0.6–1.1)
FRACTIONAL SHORTENING: 29 % (ref 28–44)
INTERVENTRICULAR SEPTUM: 1.29 CM (ref 0.6–1.1)
IVRT: 88.49 MSEC
LA MAJOR: 4.29 CM
LA MINOR: 4.74 CM
LA WIDTH: 3.38 CM
LEFT ATRIUM SIZE: 3.8 CM
LEFT ATRIUM VOLUME INDEX: 22.8 ML/M2
LEFT ATRIUM VOLUME: 49.17 CM3
LEFT INTERNAL DIMENSION IN SYSTOLE: 3.24 CM (ref 2.1–4)
LEFT VENTRICLE DIASTOLIC VOLUME INDEX: 44.01 ML/M2
LEFT VENTRICLE DIASTOLIC VOLUME: 95.1 ML
LEFT VENTRICLE MASS INDEX: 100 G/M2
LEFT VENTRICLE SYSTOLIC VOLUME INDEX: 19.5 ML/M2
LEFT VENTRICLE SYSTOLIC VOLUME: 42.2 ML
LEFT VENTRICULAR INTERNAL DIMENSION IN DIASTOLE: 4.55 CM (ref 3.5–6)
LEFT VENTRICULAR MASS: 216.25 G
LV LATERAL E/E' RATIO: 7.82 M/S
LV SEPTAL E/E' RATIO: 12.29 M/S
MV PEAK A VEL: 0.83 M/S
MV PEAK E VEL: 0.86 M/S
PISA TR MAX VEL: 2.35 M/S
PULM VEIN S/D RATIO: 1.22
PV MEAN GRADIENT: 1.52 MMHG
PV PEAK D VEL: 0.5 M/S
PV PEAK S VEL: 0.61 M/S
PV PEAK VELOCITY: 1.08 CM/S
RA MAJOR: 4.65 CM
RA WIDTH: 3.08 CM
RIGHT VENTRICULAR END-DIASTOLIC DIMENSION: 2.6 CM
SINUS: 3.38 CM
STJ: 2.82 CM
TDI LATERAL: 0.11 M/S
TDI SEPTAL: 0.07 M/S
TDI: 0.09 M/S
TR MAX PG: 22 MMHG
TRICUSPID ANNULAR PLANE SYSTOLIC EXCURSION: 2.27 CM

## 2020-10-07 ENCOUNTER — OFFICE VISIT (OUTPATIENT)
Dept: CARDIOLOGY | Facility: CLINIC | Age: 70
End: 2020-10-07
Payer: MEDICARE

## 2020-10-07 VITALS
WEIGHT: 206.38 LBS | BODY MASS INDEX: 29.54 KG/M2 | DIASTOLIC BLOOD PRESSURE: 80 MMHG | OXYGEN SATURATION: 97 % | HEIGHT: 70 IN | HEART RATE: 71 BPM | SYSTOLIC BLOOD PRESSURE: 136 MMHG

## 2020-10-07 DIAGNOSIS — I10 ESSENTIAL HYPERTENSION: ICD-10-CM

## 2020-10-07 DIAGNOSIS — I25.10 CAD, MULTIPLE VESSEL: ICD-10-CM

## 2020-10-07 DIAGNOSIS — Z95.5 STATUS POST INSERTION OF DRUG-ELUTING STENT INTO LEFT ANTERIOR DESCENDING (LAD) ARTERY: ICD-10-CM

## 2020-10-07 DIAGNOSIS — E66.3 OVERWEIGHT: ICD-10-CM

## 2020-10-07 DIAGNOSIS — I25.118 CORONARY ARTERY DISEASE OF NATIVE ARTERY OF NATIVE HEART WITH STABLE ANGINA PECTORIS: Primary | ICD-10-CM

## 2020-10-07 DIAGNOSIS — E78.2 MIXED HYPERLIPIDEMIA: ICD-10-CM

## 2020-10-07 DIAGNOSIS — Z98.61 HISTORY OF PTCA: ICD-10-CM

## 2020-10-07 DIAGNOSIS — I25.2 OLD MI (MYOCARDIAL INFARCTION): ICD-10-CM

## 2020-10-07 PROCEDURE — 3008F BODY MASS INDEX DOCD: CPT | Mod: CPTII,S$GLB,, | Performed by: INTERNAL MEDICINE

## 2020-10-07 PROCEDURE — 3079F DIAST BP 80-89 MM HG: CPT | Mod: CPTII,S$GLB,, | Performed by: INTERNAL MEDICINE

## 2020-10-07 PROCEDURE — 1159F PR MEDICATION LIST DOCUMENTED IN MEDICAL RECORD: ICD-10-PCS | Mod: S$GLB,,, | Performed by: INTERNAL MEDICINE

## 2020-10-07 PROCEDURE — 1159F MED LIST DOCD IN RCRD: CPT | Mod: S$GLB,,, | Performed by: INTERNAL MEDICINE

## 2020-10-07 PROCEDURE — 1126F AMNT PAIN NOTED NONE PRSNT: CPT | Mod: S$GLB,,, | Performed by: INTERNAL MEDICINE

## 2020-10-07 PROCEDURE — 3075F SYST BP GE 130 - 139MM HG: CPT | Mod: CPTII,S$GLB,, | Performed by: INTERNAL MEDICINE

## 2020-10-07 PROCEDURE — 3075F PR MOST RECENT SYSTOLIC BLOOD PRESS GE 130-139MM HG: ICD-10-PCS | Mod: CPTII,S$GLB,, | Performed by: INTERNAL MEDICINE

## 2020-10-07 PROCEDURE — 99214 OFFICE O/P EST MOD 30 MIN: CPT | Mod: S$GLB,,, | Performed by: INTERNAL MEDICINE

## 2020-10-07 PROCEDURE — 99999 PR PBB SHADOW E&M-EST. PATIENT-LVL III: CPT | Mod: PBBFAC,,, | Performed by: INTERNAL MEDICINE

## 2020-10-07 PROCEDURE — 1126F PR PAIN SEVERITY QUANTIFIED, NO PAIN PRESENT: ICD-10-PCS | Mod: S$GLB,,, | Performed by: INTERNAL MEDICINE

## 2020-10-07 PROCEDURE — 99214 PR OFFICE/OUTPT VISIT, EST, LEVL IV, 30-39 MIN: ICD-10-PCS | Mod: S$GLB,,, | Performed by: INTERNAL MEDICINE

## 2020-10-07 PROCEDURE — 3008F PR BODY MASS INDEX (BMI) DOCUMENTED: ICD-10-PCS | Mod: CPTII,S$GLB,, | Performed by: INTERNAL MEDICINE

## 2020-10-07 PROCEDURE — 3079F PR MOST RECENT DIASTOLIC BLOOD PRESSURE 80-89 MM HG: ICD-10-PCS | Mod: CPTII,S$GLB,, | Performed by: INTERNAL MEDICINE

## 2020-10-07 PROCEDURE — 99999 PR PBB SHADOW E&M-EST. PATIENT-LVL III: ICD-10-PCS | Mod: PBBFAC,,, | Performed by: INTERNAL MEDICINE

## 2020-10-07 NOTE — PROGRESS NOTES
Subjective:    Patient ID:  Harry Phan is a 69 y.o. male who presents for evaluation of Hypertension, Hyperlipidemia, Coronary Artery Disease, and Risk Factor Management For Atherosclerosis        HPI  Pt presents for f/u.   His current medical conditions include CAD, HTN, dyslipidemia. Nonsmoker.   Past hx pertinent for following:  s/p NSTEMI 12/08 and was found to have a 100% chronic occluded mid-LCX artery and a 95% mid-RCA lesion (culprit vessel) and underwent successful PCI with a 4 x 23 mm Vision BMS. Medical mgt was advised for his occluded LCX. He also was noted to have nonobstructive LAD disease at the time.  Pt seen 8/18 for recurrent exertional angina and had C next day with PCI performed of critical 95% prox LAD stenosis with Stent Resolute MELE x one,  LCX and patent RCA stent, normal EF.  Now here.   Has lost 10 pounds since last appt.  CAD is stable. No active anginal sxs on current med tx.  Denies cp sxs.  No CHF sxs.  BP is controlled on current med tx.  LDL 53 on 2/20 labs.  On statin tx.   Echo 9/20 normal LVEF, grade I DD.  Nuclear stress MPI 9/20 normal perfusion, normal EF.       Current Outpatient Medications:     aspirin (ECOTRIN) 81 MG EC tablet, Take 81 mg by mouth once daily., Disp: , Rfl:     atorvastatin (LIPITOR) 80 MG tablet, TAKE 1 TABLET EVERY EVENING, Disp: 90 tablet, Rfl: 3    isosorbide mononitrate (IMDUR) 30 MG 24 hr tablet, TAKE 1 TABLET EVERY DAY, Disp: 90 tablet, Rfl: 3    metoprolol tartrate (LOPRESSOR) 50 MG tablet, TAKE 1 TABLET TWICE DAILY, Disp: 180 tablet, Rfl: 3    niacin (NIASPAN EXTENDED-RELEASE) 750 MG CR tablet, Take 1 tablet (750 mg total) by mouth 2 (two) times daily., Disp: 180 tablet, Rfl: 3    nitroGLYCERIN (NITROSTAT) 0.4 MG SL tablet, Place 1 tablet (0.4 mg total) under the tongue every 5 (five) minutes as needed., Disp: 20 tablet, Rfl: 12    phenylephrine-acetaminophen 5-325 mg Cap, , Disp: , Rfl:     prasugrel (EFFIENT) 5 mg Tab, Take 1  "tablet (5 mg total) by mouth once daily., Disp: 90 tablet, Rfl: 4    ramipriL (ALTACE) 2.5 MG capsule, TAKE 1 CAPSULE EVERY DAY, Disp: 90 capsule, Rfl: 3      Review of Systems   Constitution: Positive for weight loss.   HENT: Negative.    Eyes: Negative.    Cardiovascular: Negative.    Respiratory: Negative.    Endocrine: Negative.    Hematologic/Lymphatic: Negative.    Skin: Negative.    Musculoskeletal: Negative.    Gastrointestinal: Negative.    Genitourinary: Negative.    Neurological: Negative.    Psychiatric/Behavioral: Negative.    Allergic/Immunologic: Negative.        /80 (BP Location: Right arm, Patient Position: Sitting, BP Method: Large (Manual))   Pulse 71   Ht 5' 10" (1.778 m)   Wt 93.6 kg (206 lb 5.6 oz)   SpO2 97%   BMI 29.61 kg/m²     Wt Readings from Last 3 Encounters:   10/07/20 93.6 kg (206 lb 5.6 oz)   09/30/20 98.4 kg (217 lb)   02/19/20 98.6 kg (217 lb 7.7 oz)     Temp Readings from Last 3 Encounters:   08/03/18 96.7 °F (35.9 °C) (Oral)     BP Readings from Last 3 Encounters:   10/07/20 136/80   02/19/20 128/80   10/18/19 128/78     Pulse Readings from Last 3 Encounters:   10/07/20 71   02/19/20 86   10/18/19 73          Objective:    Physical Exam   Constitutional: He is oriented to person, place, and time. He appears well-developed and well-nourished.   HENT:   Head: Normocephalic.   Neck: Normal range of motion. Neck supple. Normal carotid pulses, no hepatojugular reflux and no JVD present. Carotid bruit is not present. No thyromegaly present.   Cardiovascular: Normal rate, regular rhythm, S1 normal and S2 normal. PMI is not displaced. Exam reveals no S3, no S4, no distant heart sounds, no friction rub, no midsystolic click and no opening snap.   No murmur heard.  Pulses:       Radial pulses are 2+ on the right side and 2+ on the left side.   Pulmonary/Chest: Effort normal and breath sounds normal. He has no wheezes. He has no rales.   Abdominal: Soft. Bowel sounds are normal. " He exhibits no distension, no abdominal bruit, no ascites and no mass. There is no abdominal tenderness.   Musculoskeletal:         General: No edema.   Neurological: He is alert and oriented to person, place, and time.   Skin: Skin is warm.   Psychiatric: He has a normal mood and affect. His behavior is normal.   Nursing note and vitals reviewed.      I have reviewed all pertinent labs and cardiac studies.      Chemistry        Component Value Date/Time     02/13/2020 0932    K 4.7 02/13/2020 0932     02/13/2020 0932    CO2 23 02/13/2020 0932    BUN 13 02/13/2020 0932    CREATININE 1.0 02/13/2020 0932    GLU 84 02/13/2020 0932        Component Value Date/Time    CALCIUM 9.4 02/13/2020 0932    ALKPHOS 71 02/13/2020 0932    AST 25 02/13/2020 0932    ALT 19 02/13/2020 0932    BILITOT 1.2 (H) 02/13/2020 0932    ESTGFRAFRICA >60.0 02/13/2020 0932    EGFRNONAA >60.0 02/13/2020 0932          Lab Results   Component Value Date    WBC 6.94 08/03/2018    HGB 13.6 (L) 08/03/2018    HCT 40.8 08/03/2018    MCV 92 08/03/2018     (L) 08/03/2018         No results found for: LABA1C, HGBA1C  Lab Results   Component Value Date    CHOL 103 (L) 02/13/2020    CHOL 109 (L) 06/25/2019    CHOL 106 (L) 11/02/2018     Lab Results   Component Value Date    HDL 38 (L) 02/13/2020    HDL 40 06/25/2019    HDL 35 (L) 11/02/2018     Lab Results   Component Value Date    LDLCALC 53.8 (L) 02/13/2020    LDLCALC 58.0 (L) 06/25/2019    LDLCALC 55.4 (L) 11/02/2018     Lab Results   Component Value Date    TRIG 56 02/13/2020    TRIG 55 06/25/2019    TRIG 78 11/02/2018     Lab Results   Component Value Date    CHOLHDL 36.9 02/13/2020    CHOLHDL 36.7 06/25/2019    CHOLHDL 33.0 11/02/2018     Conclusion    · There is left ventricular concentric remodeling.  · With normal systolic function. The estimated ejection fraction is 60%.  · Grade I diastolic dysfunction.  · Normal right ventricular systolic function.      Study Details    A  complete echo was performed using complete 2D, color flow Doppler and spectral Doppler. During the study, the apical, parasternal, subcostal and suprasternal views were captured.   Echocardiography Findings    Left Ventricle    The left ventricle is  with normal systolic function. The estimated ejection fraction is 60%. There is left ventricular concentric remodeling. There is grade I diastolic dysfunction consistent with impaired relaxation. Left atrial pressure is normal.   Right Ventricle    Normal systolic function.   Left Atrium    The left atrial volume index is normal. Left atrial volume index is 22.8 mL/m2.   Right Atrium    There is normal right atrial size.   Aortic Valve    The aortic valve appears structurally normal.   Mitral Valve    The mitral valve appears structurally normal.   Tricuspid Valve    The tricuspid valve appears structurally normal.           Assessment:       1. Coronary artery disease of native artery of native heart with stable angina pectoris    2. History of PTCA    3. Mixed hyperlipidemia    4. Old MI (myocardial infarction)    5. Essential hypertension    6. Overweight    7. Status post insertion of drug-eluting stent into left anterior descending (LAD) artery    8. CAD, multiple vessel         Plan:             Stable cardiovascular conditions at present time on current medical treatment.  Stable chronic CAD.  Normal nuclear stress MPI.  Continue med tx for CAD.  Reviewed all tests and above medical conditions with patient in detail and formulated treatment plan.  Continue optimal medical treatment for cardiovascular conditions.  Cardiac low salt diet discussed.  Daily exercise encouraged, goal 30 +  minutes aerobic exercise as tolerated.  Maintaining healthy weight and weight loss goals (if needed) were discussed in clinic.  Continue current meds.  F/u in 6 months.

## 2021-04-07 ENCOUNTER — OFFICE VISIT (OUTPATIENT)
Dept: CARDIOLOGY | Facility: CLINIC | Age: 71
End: 2021-04-07
Payer: MEDICARE

## 2021-04-07 VITALS
SYSTOLIC BLOOD PRESSURE: 120 MMHG | DIASTOLIC BLOOD PRESSURE: 78 MMHG | OXYGEN SATURATION: 97 % | HEART RATE: 68 BPM | WEIGHT: 205.13 LBS | BODY MASS INDEX: 29.43 KG/M2

## 2021-04-07 DIAGNOSIS — I25.2 OLD MI (MYOCARDIAL INFARCTION): ICD-10-CM

## 2021-04-07 DIAGNOSIS — I25.10 CAD, MULTIPLE VESSEL: Primary | ICD-10-CM

## 2021-04-07 DIAGNOSIS — E78.2 MIXED HYPERLIPIDEMIA: ICD-10-CM

## 2021-04-07 DIAGNOSIS — E66.3 OVERWEIGHT: ICD-10-CM

## 2021-04-07 DIAGNOSIS — Z98.61 HISTORY OF PTCA: ICD-10-CM

## 2021-04-07 DIAGNOSIS — Z95.5 STATUS POST INSERTION OF DRUG-ELUTING STENT INTO LEFT ANTERIOR DESCENDING (LAD) ARTERY: ICD-10-CM

## 2021-04-07 DIAGNOSIS — I10 ESSENTIAL HYPERTENSION: ICD-10-CM

## 2021-04-07 DIAGNOSIS — I25.118 CORONARY ARTERY DISEASE OF NATIVE ARTERY OF NATIVE HEART WITH STABLE ANGINA PECTORIS: ICD-10-CM

## 2021-04-07 PROCEDURE — 99999 PR PBB SHADOW E&M-EST. PATIENT-LVL III: ICD-10-PCS | Mod: PBBFAC,,, | Performed by: INTERNAL MEDICINE

## 2021-04-07 PROCEDURE — 99214 OFFICE O/P EST MOD 30 MIN: CPT | Mod: S$GLB,,, | Performed by: INTERNAL MEDICINE

## 2021-04-07 PROCEDURE — 1126F AMNT PAIN NOTED NONE PRSNT: CPT | Mod: S$GLB,,, | Performed by: INTERNAL MEDICINE

## 2021-04-07 PROCEDURE — 3008F PR BODY MASS INDEX (BMI) DOCUMENTED: ICD-10-PCS | Mod: CPTII,S$GLB,, | Performed by: INTERNAL MEDICINE

## 2021-04-07 PROCEDURE — 99214 PR OFFICE/OUTPT VISIT, EST, LEVL IV, 30-39 MIN: ICD-10-PCS | Mod: S$GLB,,, | Performed by: INTERNAL MEDICINE

## 2021-04-07 PROCEDURE — 1159F PR MEDICATION LIST DOCUMENTED IN MEDICAL RECORD: ICD-10-PCS | Mod: S$GLB,,, | Performed by: INTERNAL MEDICINE

## 2021-04-07 PROCEDURE — 3008F BODY MASS INDEX DOCD: CPT | Mod: CPTII,S$GLB,, | Performed by: INTERNAL MEDICINE

## 2021-04-07 PROCEDURE — 99999 PR PBB SHADOW E&M-EST. PATIENT-LVL III: CPT | Mod: PBBFAC,,, | Performed by: INTERNAL MEDICINE

## 2021-04-07 PROCEDURE — 1126F PR PAIN SEVERITY QUANTIFIED, NO PAIN PRESENT: ICD-10-PCS | Mod: S$GLB,,, | Performed by: INTERNAL MEDICINE

## 2021-04-07 PROCEDURE — 1159F MED LIST DOCD IN RCRD: CPT | Mod: S$GLB,,, | Performed by: INTERNAL MEDICINE

## 2021-10-06 ENCOUNTER — TELEPHONE (OUTPATIENT)
Dept: CARDIOLOGY | Facility: CLINIC | Age: 71
End: 2021-10-06

## 2021-10-06 DIAGNOSIS — I25.10 CORONARY ARTERY DISEASE, UNSPECIFIED VESSEL OR LESION TYPE, UNSPECIFIED WHETHER ANGINA PRESENT, UNSPECIFIED WHETHER NATIVE OR TRANSPLANTED HEART: Primary | ICD-10-CM

## 2021-10-08 ENCOUNTER — HOSPITAL ENCOUNTER (OUTPATIENT)
Dept: CARDIOLOGY | Facility: HOSPITAL | Age: 71
Discharge: HOME | End: 2021-10-08
Attending: INTERNAL MEDICINE
Payer: MEDICARE

## 2021-10-08 ENCOUNTER — OFFICE VISIT (OUTPATIENT)
Dept: CARDIOLOGY | Facility: CLINIC | Age: 71
End: 2021-10-08
Payer: MEDICARE

## 2021-10-08 VITALS
BODY MASS INDEX: 28.6 KG/M2 | OXYGEN SATURATION: 99 % | SYSTOLIC BLOOD PRESSURE: 140 MMHG | HEIGHT: 70 IN | HEART RATE: 73 BPM | WEIGHT: 199.75 LBS | DIASTOLIC BLOOD PRESSURE: 82 MMHG

## 2021-10-08 DIAGNOSIS — I25.10 CAD, MULTIPLE VESSEL: ICD-10-CM

## 2021-10-08 DIAGNOSIS — Z95.5 STATUS POST INSERTION OF DRUG-ELUTING STENT INTO LEFT ANTERIOR DESCENDING (LAD) ARTERY: ICD-10-CM

## 2021-10-08 DIAGNOSIS — E78.2 MIXED HYPERLIPIDEMIA: ICD-10-CM

## 2021-10-08 DIAGNOSIS — E66.3 OVERWEIGHT: ICD-10-CM

## 2021-10-08 DIAGNOSIS — I20.9 AP (ANGINA PECTORIS): Primary | ICD-10-CM

## 2021-10-08 DIAGNOSIS — I25.118 CORONARY ARTERY DISEASE OF NATIVE ARTERY OF NATIVE HEART WITH STABLE ANGINA PECTORIS: ICD-10-CM

## 2021-10-08 DIAGNOSIS — I10 PRIMARY HYPERTENSION: ICD-10-CM

## 2021-10-08 DIAGNOSIS — Z98.61 HISTORY OF PTCA: ICD-10-CM

## 2021-10-08 DIAGNOSIS — I25.10 CORONARY ARTERY DISEASE, UNSPECIFIED VESSEL OR LESION TYPE, UNSPECIFIED WHETHER ANGINA PRESENT, UNSPECIFIED WHETHER NATIVE OR TRANSPLANTED HEART: ICD-10-CM

## 2021-10-08 DIAGNOSIS — I25.2 OLD MI (MYOCARDIAL INFARCTION): ICD-10-CM

## 2021-10-08 PROCEDURE — 99999 PR PBB SHADOW E&M-EST. PATIENT-LVL III: CPT | Mod: PBBFAC,,, | Performed by: INTERNAL MEDICINE

## 2021-10-08 PROCEDURE — 3079F DIAST BP 80-89 MM HG: CPT | Mod: CPTII,S$GLB,, | Performed by: INTERNAL MEDICINE

## 2021-10-08 PROCEDURE — 3079F PR MOST RECENT DIASTOLIC BLOOD PRESSURE 80-89 MM HG: ICD-10-PCS | Mod: CPTII,S$GLB,, | Performed by: INTERNAL MEDICINE

## 2021-10-08 PROCEDURE — 1159F MED LIST DOCD IN RCRD: CPT | Mod: CPTII,S$GLB,, | Performed by: INTERNAL MEDICINE

## 2021-10-08 PROCEDURE — 93010 ELECTROCARDIOGRAM REPORT: CPT | Mod: ,,, | Performed by: INTERNAL MEDICINE

## 2021-10-08 PROCEDURE — 99999 PR PBB SHADOW E&M-EST. PATIENT-LVL III: ICD-10-PCS | Mod: PBBFAC,,, | Performed by: INTERNAL MEDICINE

## 2021-10-08 PROCEDURE — 93005 ELECTROCARDIOGRAM TRACING: CPT

## 2021-10-08 PROCEDURE — 93010 EKG 12-LEAD: ICD-10-PCS | Mod: ,,, | Performed by: INTERNAL MEDICINE

## 2021-10-08 PROCEDURE — 99214 OFFICE O/P EST MOD 30 MIN: CPT | Mod: S$GLB,,, | Performed by: INTERNAL MEDICINE

## 2021-10-08 PROCEDURE — 1160F RVW MEDS BY RX/DR IN RCRD: CPT | Mod: CPTII,S$GLB,, | Performed by: INTERNAL MEDICINE

## 2021-10-08 PROCEDURE — 3077F PR MOST RECENT SYSTOLIC BLOOD PRESSURE >= 140 MM HG: ICD-10-PCS | Mod: CPTII,S$GLB,, | Performed by: INTERNAL MEDICINE

## 2021-10-08 PROCEDURE — 3077F SYST BP >= 140 MM HG: CPT | Mod: CPTII,S$GLB,, | Performed by: INTERNAL MEDICINE

## 2021-10-08 PROCEDURE — 4010F PR ACE/ARB THEARPY RXD/TAKEN: ICD-10-PCS | Mod: CPTII,S$GLB,, | Performed by: INTERNAL MEDICINE

## 2021-10-08 PROCEDURE — 1159F PR MEDICATION LIST DOCUMENTED IN MEDICAL RECORD: ICD-10-PCS | Mod: CPTII,S$GLB,, | Performed by: INTERNAL MEDICINE

## 2021-10-08 PROCEDURE — 3008F PR BODY MASS INDEX (BMI) DOCUMENTED: ICD-10-PCS | Mod: CPTII,S$GLB,, | Performed by: INTERNAL MEDICINE

## 2021-10-08 PROCEDURE — 3008F BODY MASS INDEX DOCD: CPT | Mod: CPTII,S$GLB,, | Performed by: INTERNAL MEDICINE

## 2021-10-08 PROCEDURE — 4010F ACE/ARB THERAPY RXD/TAKEN: CPT | Mod: CPTII,S$GLB,, | Performed by: INTERNAL MEDICINE

## 2021-10-08 PROCEDURE — 99214 PR OFFICE/OUTPT VISIT, EST, LEVL IV, 30-39 MIN: ICD-10-PCS | Mod: S$GLB,,, | Performed by: INTERNAL MEDICINE

## 2021-10-08 PROCEDURE — 1160F PR REVIEW ALL MEDS BY PRESCRIBER/CLIN PHARMACIST DOCUMENTED: ICD-10-PCS | Mod: CPTII,S$GLB,, | Performed by: INTERNAL MEDICINE

## 2021-10-08 RX ORDER — RAMIPRIL 5 MG/1
5 CAPSULE ORAL DAILY
Qty: 90 CAPSULE | Refills: 3 | Status: SHIPPED | OUTPATIENT
Start: 2021-10-08 | End: 2022-08-12

## 2022-03-30 ENCOUNTER — OFFICE VISIT (OUTPATIENT)
Dept: CARDIOLOGY | Facility: CLINIC | Age: 72
End: 2022-03-30
Payer: MEDICARE

## 2022-03-30 VITALS
OXYGEN SATURATION: 98 % | RESPIRATION RATE: 16 BRPM | DIASTOLIC BLOOD PRESSURE: 80 MMHG | HEIGHT: 70 IN | SYSTOLIC BLOOD PRESSURE: 142 MMHG | WEIGHT: 202.19 LBS | BODY MASS INDEX: 28.95 KG/M2 | HEART RATE: 67 BPM

## 2022-03-30 DIAGNOSIS — I25.118 CORONARY ARTERY DISEASE OF NATIVE ARTERY OF NATIVE HEART WITH STABLE ANGINA PECTORIS: Primary | ICD-10-CM

## 2022-03-30 DIAGNOSIS — I25.10 CAD, MULTIPLE VESSEL: ICD-10-CM

## 2022-03-30 DIAGNOSIS — Z95.5 STATUS POST INSERTION OF DRUG-ELUTING STENT INTO LEFT ANTERIOR DESCENDING (LAD) ARTERY: ICD-10-CM

## 2022-03-30 DIAGNOSIS — E78.2 MIXED HYPERLIPIDEMIA: ICD-10-CM

## 2022-03-30 DIAGNOSIS — I25.2 OLD MI (MYOCARDIAL INFARCTION): ICD-10-CM

## 2022-03-30 DIAGNOSIS — E66.3 OVERWEIGHT: ICD-10-CM

## 2022-03-30 DIAGNOSIS — Z98.61 HISTORY OF PTCA: ICD-10-CM

## 2022-03-30 DIAGNOSIS — I10 PRIMARY HYPERTENSION: ICD-10-CM

## 2022-03-30 PROCEDURE — 3008F PR BODY MASS INDEX (BMI) DOCUMENTED: ICD-10-PCS | Mod: CPTII,S$GLB,, | Performed by: INTERNAL MEDICINE

## 2022-03-30 PROCEDURE — 1160F PR REVIEW ALL MEDS BY PRESCRIBER/CLIN PHARMACIST DOCUMENTED: ICD-10-PCS | Mod: CPTII,S$GLB,, | Performed by: INTERNAL MEDICINE

## 2022-03-30 PROCEDURE — 1101F PT FALLS ASSESS-DOCD LE1/YR: CPT | Mod: CPTII,S$GLB,, | Performed by: INTERNAL MEDICINE

## 2022-03-30 PROCEDURE — 99214 PR OFFICE/OUTPT VISIT, EST, LEVL IV, 30-39 MIN: ICD-10-PCS | Mod: S$GLB,,, | Performed by: INTERNAL MEDICINE

## 2022-03-30 PROCEDURE — 3077F PR MOST RECENT SYSTOLIC BLOOD PRESSURE >= 140 MM HG: ICD-10-PCS | Mod: CPTII,S$GLB,, | Performed by: INTERNAL MEDICINE

## 2022-03-30 PROCEDURE — 1159F PR MEDICATION LIST DOCUMENTED IN MEDICAL RECORD: ICD-10-PCS | Mod: CPTII,S$GLB,, | Performed by: INTERNAL MEDICINE

## 2022-03-30 PROCEDURE — 1159F MED LIST DOCD IN RCRD: CPT | Mod: CPTII,S$GLB,, | Performed by: INTERNAL MEDICINE

## 2022-03-30 PROCEDURE — 99999 PR PBB SHADOW E&M-EST. PATIENT-LVL III: CPT | Mod: PBBFAC,,, | Performed by: INTERNAL MEDICINE

## 2022-03-30 PROCEDURE — 3288F FALL RISK ASSESSMENT DOCD: CPT | Mod: CPTII,S$GLB,, | Performed by: INTERNAL MEDICINE

## 2022-03-30 PROCEDURE — 1160F RVW MEDS BY RX/DR IN RCRD: CPT | Mod: CPTII,S$GLB,, | Performed by: INTERNAL MEDICINE

## 2022-03-30 PROCEDURE — 1126F AMNT PAIN NOTED NONE PRSNT: CPT | Mod: CPTII,S$GLB,, | Performed by: INTERNAL MEDICINE

## 2022-03-30 PROCEDURE — 3008F BODY MASS INDEX DOCD: CPT | Mod: CPTII,S$GLB,, | Performed by: INTERNAL MEDICINE

## 2022-03-30 PROCEDURE — 1101F PR PT FALLS ASSESS DOC 0-1 FALLS W/OUT INJ PAST YR: ICD-10-PCS | Mod: CPTII,S$GLB,, | Performed by: INTERNAL MEDICINE

## 2022-03-30 PROCEDURE — 1126F PR PAIN SEVERITY QUANTIFIED, NO PAIN PRESENT: ICD-10-PCS | Mod: CPTII,S$GLB,, | Performed by: INTERNAL MEDICINE

## 2022-03-30 PROCEDURE — 99214 OFFICE O/P EST MOD 30 MIN: CPT | Mod: S$GLB,,, | Performed by: INTERNAL MEDICINE

## 2022-03-30 PROCEDURE — 3079F DIAST BP 80-89 MM HG: CPT | Mod: CPTII,S$GLB,, | Performed by: INTERNAL MEDICINE

## 2022-03-30 PROCEDURE — 3288F PR FALLS RISK ASSESSMENT DOCUMENTED: ICD-10-PCS | Mod: CPTII,S$GLB,, | Performed by: INTERNAL MEDICINE

## 2022-03-30 PROCEDURE — 3079F PR MOST RECENT DIASTOLIC BLOOD PRESSURE 80-89 MM HG: ICD-10-PCS | Mod: CPTII,S$GLB,, | Performed by: INTERNAL MEDICINE

## 2022-03-30 PROCEDURE — 99999 PR PBB SHADOW E&M-EST. PATIENT-LVL III: ICD-10-PCS | Mod: PBBFAC,,, | Performed by: INTERNAL MEDICINE

## 2022-03-30 PROCEDURE — 3077F SYST BP >= 140 MM HG: CPT | Mod: CPTII,S$GLB,, | Performed by: INTERNAL MEDICINE

## 2022-03-30 NOTE — PROGRESS NOTES
Subjective:    Patient ID:  Harry Phan is a 71 y.o. male who presents for evaluation of Hypertension, Coronary Artery Disease, Hyperlipidemia, and Risk Factor Management For Atherosclerosis      HPI Pt presents for eval.   His current medical conditions include CAD, HTN, dyslipidemia.   Nonsmoker.   Past hx pertinent for following:  s/p NSTEMI 12/08 and was found to have a 100% chronic occluded mid-LCX artery and a 95% mid-RCA lesion (culprit vessel) and underwent successful PCI with a 4 x 23 mm Vision BMS. Medical mgt was advised for his occluded LCX. He also was noted to have nonobstructive LAD disease at the time.  Pt seen 8/18 for recurrent exertional angina and had C next day with PCI performed of critical 95% prox LAD stenosis with Stent Resolute MELE x one,  LCX and patent RCA stent, normal EF.  Echo 9/20 normal LVEF, grade I DD.  Nuclear stress MPI 9/20 normal perfusion, normal EF.   ecg 10/8/21 personally reviewed: NSR, normal ECG.  Now here.  Altace increased last appt.  BP is controlled.  CAD is stable.  Angina controlled on current med tx.  No active CP sxs.  No CHF sxs.  PCP labs reviewed 3/22:  Lipids are excellent on statin tx.  Weight stable.  Active.  Compliant w meds.     Past Medical History:   Diagnosis Date    Acute coronary syndrome     Coronary artery disease     Hyperlipidemia     Hypertension     Old MI (myocardial infarction) 4/17/2014    Status post insertion of drug-eluting stent into left anterior descending (LAD) artery 8/3/2018       Current Outpatient Medications:     aspirin (ECOTRIN) 81 MG EC tablet, Take 81 mg by mouth once daily., Disp: , Rfl:     atorvastatin (LIPITOR) 80 MG tablet, TAKE 1 TABLET EVERY EVENING, Disp: 90 tablet, Rfl: 3    isosorbide mononitrate (IMDUR) 30 MG 24 hr tablet, TAKE 1 TABLET EVERY DAY, Disp: 90 tablet, Rfl: 3    metoprolol tartrate (LOPRESSOR) 50 MG tablet, TAKE 1 TABLET TWICE DAILY, Disp: 180 tablet, Rfl: 3    niacin (NIASPAN  "EXTENDED-RELEASE) 750 MG CR tablet, Take 1 tablet (750 mg total) by mouth 2 (two) times daily., Disp: 180 tablet, Rfl: 3    nitroGLYCERIN (NITROSTAT) 0.4 MG SL tablet, Place 1 tablet (0.4 mg total) under the tongue every 5 (five) minutes as needed., Disp: 20 tablet, Rfl: 12    prasugreL (EFFIENT) 5 mg Tab, TAKE 1 TABLET EVERY DAY, Disp: 90 tablet, Rfl: 4    ramipriL (ALTACE) 5 MG capsule, Take 1 capsule (5 mg total) by mouth once daily., Disp: 90 capsule, Rfl: 3      Review of Systems   Constitutional: Negative.   HENT: Negative.    Eyes: Negative.    Cardiovascular: Negative.    Respiratory: Negative.    Endocrine: Negative.    Hematologic/Lymphatic: Negative.    Skin: Negative.    Musculoskeletal: Negative.    Gastrointestinal: Negative.    Genitourinary: Negative.    Neurological: Negative.    Psychiatric/Behavioral: Negative.    Allergic/Immunologic: Negative.        BP (!) 142/80 (BP Location: Right arm, Patient Position: Sitting, BP Method: Medium (Manual))   Pulse 67   Resp 16   Ht 5' 10" (1.778 m)   Wt 91.7 kg (202 lb 2.6 oz)   SpO2 98%   BMI 29.01 kg/m²       Wt Readings from Last 3 Encounters:   03/30/22 91.7 kg (202 lb 2.6 oz)   10/08/21 90.6 kg (199 lb 11.8 oz)   04/07/21 93.1 kg (205 lb 2.2 oz)     Temp Readings from Last 3 Encounters:   08/03/18 96.7 °F (35.9 °C) (Oral)     BP Readings from Last 3 Encounters:   03/30/22 (!) 142/80   10/08/21 (!) 140/82   04/07/21 120/78     Pulse Readings from Last 3 Encounters:   03/30/22 67   10/08/21 73   04/07/21 68            Objective:    Physical Exam  Vitals and nursing note reviewed.   Constitutional:       Appearance: He is well-developed.   HENT:      Head: Normocephalic.   Neck:      Thyroid: No thyromegaly.      Vascular: Normal carotid pulses. No carotid bruit, hepatojugular reflux or JVD.   Cardiovascular:      Rate and Rhythm: Normal rate and regular rhythm.      Chest Wall: PMI is not displaced.      Pulses: No midsystolic click and no opening " snap.           Radial pulses are 2+ on the right side and 2+ on the left side.      Heart sounds: S1 normal and S2 normal. Heart sounds not distant. No murmur heard.    No friction rub. No S3 or S4 sounds.   Pulmonary:      Effort: Pulmonary effort is normal.      Breath sounds: Normal breath sounds. No wheezing or rales.   Abdominal:      General: Bowel sounds are normal. There is no distension or abdominal bruit.      Palpations: Abdomen is soft. There is no mass.      Tenderness: There is no abdominal tenderness.   Musculoskeletal:      Cervical back: Normal range of motion and neck supple.   Skin:     General: Skin is warm.   Neurological:      Mental Status: He is alert and oriented to person, place, and time.   Psychiatric:         Behavior: Behavior normal.         I have reviewed all pertinent labs and cardiac studies.      Chemistry        Component Value Date/Time     02/13/2020 0932    K 4.7 02/13/2020 0932     02/13/2020 0932    CO2 23 02/13/2020 0932    BUN 13 02/13/2020 0932    CREATININE 1.0 02/13/2020 0932    GLU 84 02/13/2020 0932        Component Value Date/Time    CALCIUM 9.4 02/13/2020 0932    ALKPHOS 71 02/13/2020 0932    AST 25 02/13/2020 0932    ALT 19 02/13/2020 0932    BILITOT 1.2 (H) 02/13/2020 0932    ESTGFRAFRICA >60.0 02/13/2020 0932    EGFRNONAA >60.0 02/13/2020 0932        Lab Results   Component Value Date    WBC 6.94 08/03/2018    HGB 13.6 (L) 08/03/2018    HCT 40.8 08/03/2018    MCV 92 08/03/2018     (L) 08/03/2018         No results found for: LABA1C, HGBA1C  Lab Results   Component Value Date    CHOL 103 (L) 02/13/2020    CHOL 109 (L) 06/25/2019    CHOL 106 (L) 11/02/2018     Lab Results   Component Value Date    HDL 38 (L) 02/13/2020    HDL 40 06/25/2019    HDL 35 (L) 11/02/2018     Lab Results   Component Value Date    LDLCALC 53.8 (L) 02/13/2020    LDLCALC 58.0 (L) 06/25/2019    LDLCALC 55.4 (L) 11/02/2018     Lab Results   Component Value Date    TRIG 56  02/13/2020    TRIG 55 06/25/2019    TRIG 78 11/02/2018     Lab Results   Component Value Date    CHOLHDL 36.9 02/13/2020    CHOLHDL 36.7 06/25/2019    CHOLHDL 33.0 11/02/2018             Assessment:       1. Coronary artery disease of native artery of native heart with stable angina pectoris    2. Overweight    3. Status post insertion of drug-eluting stent into left anterior descending (LAD) artery    4. Old MI (myocardial infarction)    5. Mixed hyperlipidemia    6. Primary hypertension    7. CAD, multiple vessel    8. History of PTCA         Plan:             Stable cardiovascular conditions at present time on current medical treatment.  Continue current CV meds.  Continue low dose Effient for multivessel CAD.  Reviewed all tests and above medical conditions with patient in detail and formulated treatment plan.  Continue optimal medical treatment for cardiovascular conditions.  Cardiac low salt diet advised.  Daily exercise encouraged, with the goal 30 +  minutes aerobic exercise as tolerated.  Maintaining healthy weight and weight loss goals (if needed) were discussed in clinic.  Need for BP control and HTN goals (if needed) were discussed and tx plan formulated.  Home BP monitoring.  Goal < 130/80.  Continue current HTN meds; BP is controlled and at goal at home.  Importance of optimal lipid control were discussed in detail as well as possible pharmacologic and lifestyle changes that may be needed.  Continue statin tx.  F/u in 6 months.      I have reviewed all pertinent labs and cardiac studies independently. Plans and recommendations have been formulated under my direct supervision. All questions answered and patient voiced understanding.

## 2022-09-30 ENCOUNTER — OFFICE VISIT (OUTPATIENT)
Dept: CARDIOLOGY | Facility: CLINIC | Age: 72
End: 2022-09-30
Payer: MEDICARE

## 2022-09-30 VITALS
SYSTOLIC BLOOD PRESSURE: 146 MMHG | RESPIRATION RATE: 16 BRPM | OXYGEN SATURATION: 98 % | DIASTOLIC BLOOD PRESSURE: 80 MMHG | HEART RATE: 75 BPM | HEIGHT: 70 IN | BODY MASS INDEX: 28.85 KG/M2 | WEIGHT: 201.5 LBS

## 2022-09-30 DIAGNOSIS — E66.3 OVERWEIGHT: ICD-10-CM

## 2022-09-30 DIAGNOSIS — I25.10 CAD, MULTIPLE VESSEL: Primary | ICD-10-CM

## 2022-09-30 DIAGNOSIS — I10 PRIMARY HYPERTENSION: ICD-10-CM

## 2022-09-30 DIAGNOSIS — I25.2 OLD MI (MYOCARDIAL INFARCTION): ICD-10-CM

## 2022-09-30 DIAGNOSIS — Z98.61 HISTORY OF PTCA: ICD-10-CM

## 2022-09-30 DIAGNOSIS — E78.2 MIXED HYPERLIPIDEMIA: ICD-10-CM

## 2022-09-30 DIAGNOSIS — Z95.5 STATUS POST INSERTION OF DRUG-ELUTING STENT INTO LEFT ANTERIOR DESCENDING (LAD) ARTERY: ICD-10-CM

## 2022-09-30 DIAGNOSIS — I25.118 CORONARY ARTERY DISEASE OF NATIVE ARTERY OF NATIVE HEART WITH STABLE ANGINA PECTORIS: ICD-10-CM

## 2022-09-30 PROCEDURE — 3077F SYST BP >= 140 MM HG: CPT | Mod: CPTII,S$GLB,, | Performed by: INTERNAL MEDICINE

## 2022-09-30 PROCEDURE — 99214 PR OFFICE/OUTPT VISIT, EST, LEVL IV, 30-39 MIN: ICD-10-PCS | Mod: S$GLB,,, | Performed by: INTERNAL MEDICINE

## 2022-09-30 PROCEDURE — 3077F PR MOST RECENT SYSTOLIC BLOOD PRESSURE >= 140 MM HG: ICD-10-PCS | Mod: CPTII,S$GLB,, | Performed by: INTERNAL MEDICINE

## 2022-09-30 PROCEDURE — 99999 PR PBB SHADOW E&M-EST. PATIENT-LVL III: ICD-10-PCS | Mod: PBBFAC,,, | Performed by: INTERNAL MEDICINE

## 2022-09-30 PROCEDURE — 1126F AMNT PAIN NOTED NONE PRSNT: CPT | Mod: CPTII,S$GLB,, | Performed by: INTERNAL MEDICINE

## 2022-09-30 PROCEDURE — 3008F BODY MASS INDEX DOCD: CPT | Mod: CPTII,S$GLB,, | Performed by: INTERNAL MEDICINE

## 2022-09-30 PROCEDURE — 3079F PR MOST RECENT DIASTOLIC BLOOD PRESSURE 80-89 MM HG: ICD-10-PCS | Mod: CPTII,S$GLB,, | Performed by: INTERNAL MEDICINE

## 2022-09-30 PROCEDURE — 3008F PR BODY MASS INDEX (BMI) DOCUMENTED: ICD-10-PCS | Mod: CPTII,S$GLB,, | Performed by: INTERNAL MEDICINE

## 2022-09-30 PROCEDURE — 4010F PR ACE/ARB THEARPY RXD/TAKEN: ICD-10-PCS | Mod: CPTII,S$GLB,, | Performed by: INTERNAL MEDICINE

## 2022-09-30 PROCEDURE — 3079F DIAST BP 80-89 MM HG: CPT | Mod: CPTII,S$GLB,, | Performed by: INTERNAL MEDICINE

## 2022-09-30 PROCEDURE — 99999 PR PBB SHADOW E&M-EST. PATIENT-LVL III: CPT | Mod: PBBFAC,,, | Performed by: INTERNAL MEDICINE

## 2022-09-30 PROCEDURE — 4010F ACE/ARB THERAPY RXD/TAKEN: CPT | Mod: CPTII,S$GLB,, | Performed by: INTERNAL MEDICINE

## 2022-09-30 PROCEDURE — 1126F PR PAIN SEVERITY QUANTIFIED, NO PAIN PRESENT: ICD-10-PCS | Mod: CPTII,S$GLB,, | Performed by: INTERNAL MEDICINE

## 2022-09-30 PROCEDURE — 99214 OFFICE O/P EST MOD 30 MIN: CPT | Mod: S$GLB,,, | Performed by: INTERNAL MEDICINE

## 2022-09-30 NOTE — PROGRESS NOTES
Subjective:    Patient ID:  Harry Pahn is a 71 y.o. male who presents for evaluation of Hyperlipidemia, Coronary Artery Disease, and Hypertension      HPIPt presents for eval.   His current medical conditions include CAD, HTN, dyslipidemia.   Nonsmoker.   Past hx pertinent for following:  s/p NSTEMI 12/08 and was found to have a 100% chronic occluded mid-LCX artery and a 95% mid-RCA lesion (culprit vessel) and underwent successful PCI with a 4 x 23 mm Vision BMS.   Medical mgt was advised for his occluded LCX. He also was noted to have nonobstructive LAD disease at the time.  Pt seen 8/18 for recurrent exertional angina and had LHC next day with PCI performed of critical 95% prox LAD stenosis with Stent Resolute MELE x one,  LCX and patent RCA stent, normal EF.  Echo 9/20 normal LVEF, grade I DD.  Nuclear stress MPI 9/20 normal perfusion, normal EF.   ecg 10/8/21 NSR, normal ECG.  Now here.  CAD is stable.  Angina controlled on current med tx.  No active CP sxs.  No  CHF sx.  Weight stable.  Lipids have been controlled on statin tx.  Active.  PCP checking labs next week.  BP checks at home are at goal.  Compliant w meds.  Compliant w DAPT, no abnl bleeding.    Past Medical History:   Diagnosis Date    Acute coronary syndrome     Coronary artery disease     Hyperlipidemia     Hypertension     Old MI (myocardial infarction) 4/17/2014    Status post insertion of drug-eluting stent into left anterior descending (LAD) artery 8/3/2018         Current Outpatient Medications   Medication Instructions    aspirin (ECOTRIN) 81 mg, Daily    atorvastatin (LIPITOR) 80 MG tablet TAKE 1 TABLET EVERY EVENING    isosorbide mononitrate (IMDUR) 30 MG 24 hr tablet TAKE 1 TABLET EVERY DAY    metoprolol tartrate (LOPRESSOR) 50 MG tablet TAKE 1 TABLET TWICE DAILY    niacin (NIASPAN EXTENDED-RELEASE) 750 mg, Oral, 2 times daily    nitroGLYCERIN (NITROSTAT) 0.4 mg, Sublingual, Every 5 min PRN    prasugreL (EFFIENT) 5 mg Tab TAKE 1  "TABLET EVERY DAY    ramipriL (ALTACE) 5 MG capsule TAKE 1 CAPSULE EVERY DAY       Review of Systems   Constitutional: Negative.   HENT: Negative.     Eyes: Negative.    Cardiovascular: Negative.    Respiratory: Negative.     Endocrine: Negative.    Hematologic/Lymphatic: Negative.    Skin: Negative.    Musculoskeletal: Negative.    Gastrointestinal: Negative.    Genitourinary: Negative.    Neurological: Negative.    Psychiatric/Behavioral: Negative.     Allergic/Immunologic: Negative.         BP (!) 146/80 (BP Location: Right arm, Patient Position: Sitting, BP Method: Large (Manual))   Pulse 75   Resp 16   Ht 5' 10" (1.778 m)   Wt 91.4 kg (201 lb 8 oz)   SpO2 98%   BMI 28.91 kg/m²       Wt Readings from Last 3 Encounters:   09/30/22 91.4 kg (201 lb 8 oz)   03/30/22 91.7 kg (202 lb 2.6 oz)   10/08/21 90.6 kg (199 lb 11.8 oz)     Temp Readings from Last 3 Encounters:   08/03/18 96.7 °F (35.9 °C) (Oral)     BP Readings from Last 3 Encounters:   09/30/22 (!) 146/80   03/30/22 (!) 142/80   10/08/21 (!) 140/82     Pulse Readings from Last 3 Encounters:   09/30/22 75   03/30/22 67   10/08/21 73       Objective:    Physical Exam  Vitals and nursing note reviewed.   Constitutional:       Appearance: He is well-developed.   HENT:      Head: Normocephalic.   Neck:      Thyroid: No thyromegaly.      Vascular: No carotid bruit or JVD.   Cardiovascular:      Rate and Rhythm: Normal rate and regular rhythm.      Pulses:           Radial pulses are 2+ on the right side and 2+ on the left side.      Heart sounds: S1 normal and S2 normal. Heart sounds not distant. No midsystolic click and no opening snap. No murmur heard.    No friction rub. No S3 or S4 sounds.   Pulmonary:      Effort: Pulmonary effort is normal.      Breath sounds: Normal breath sounds. No wheezing or rales.   Abdominal:      General: Bowel sounds are normal. There is no distension or abdominal bruit.      Palpations: Abdomen is soft.      Tenderness: There " is no abdominal tenderness.   Musculoskeletal:      Cervical back: Neck supple.   Skin:     General: Skin is warm.   Neurological:      Mental Status: He is alert and oriented to person, place, and time.   Psychiatric:         Behavior: Behavior normal.       I have reviewed all pertinent labs and cardiac studies.            Assessment:       1. CAD, multiple vessel    2. Coronary artery disease of native artery of native heart with stable angina pectoris    3. Primary hypertension    4. History of PTCA    5. Mixed hyperlipidemia    6. Old MI (myocardial infarction)    7. Status post insertion of drug-eluting stent into left anterior descending (LAD) artery    8. Overweight         Plan:             Stable cardiovascular conditions at present time on current medical treatment.  Reviewed all tests and above medical conditions with patient in detail and formulated treatment plan.  Continue optimal medical treatment for cardiovascular conditions.  Cardiac low salt diet advised.  Daily exercise encouraged, with the goal 30 +  minutes aerobic exercise as tolerated.  Maintaining healthy weight and weight loss goals (if needed) were discussed in clinic.  Need for BP control and HTN goals (if needed) were discussed and tx plan formulated.  Goal BP < 130/80.  Pt to keep BP log book.  Check 3 days/week, am/pm.  Will reassess BP control next appt; adjust meds if needed.  Cut back some on coffee daily (4 cups day).  Importance of optimal lipid control were discussed in detail as well as possible pharmacologic and lifestyle changes that may be needed  F/u in 4 months.      I have reviewed all pertinent labs and cardiac studies independently. Plans and recommendations have been formulated under my direct supervision. All questions answered and patient voiced understanding.

## 2022-10-06 ENCOUNTER — PATIENT MESSAGE (OUTPATIENT)
Dept: CARDIOLOGY | Facility: CLINIC | Age: 72
End: 2022-10-06
Payer: MEDICARE

## 2023-01-20 ENCOUNTER — TELEPHONE (OUTPATIENT)
Dept: CARDIOLOGY | Facility: CLINIC | Age: 73
End: 2023-01-20
Payer: MEDICARE

## 2023-01-20 ENCOUNTER — OFFICE VISIT (OUTPATIENT)
Dept: CARDIOLOGY | Facility: CLINIC | Age: 73
End: 2023-01-20
Payer: MEDICARE

## 2023-01-20 ENCOUNTER — HOSPITAL ENCOUNTER (OUTPATIENT)
Dept: CARDIOLOGY | Facility: HOSPITAL | Age: 73
Discharge: HOME | End: 2023-01-20
Attending: INTERNAL MEDICINE
Payer: MEDICARE

## 2023-01-20 VITALS
SYSTOLIC BLOOD PRESSURE: 126 MMHG | BODY MASS INDEX: 28.5 KG/M2 | WEIGHT: 198.63 LBS | HEART RATE: 68 BPM | RESPIRATION RATE: 16 BRPM | DIASTOLIC BLOOD PRESSURE: 80 MMHG

## 2023-01-20 DIAGNOSIS — I25.10 CAD, MULTIPLE VESSEL: Primary | ICD-10-CM

## 2023-01-20 DIAGNOSIS — I20.9 AP (ANGINA PECTORIS): ICD-10-CM

## 2023-01-20 DIAGNOSIS — E78.2 MIXED HYPERLIPIDEMIA: ICD-10-CM

## 2023-01-20 DIAGNOSIS — I25.10 CORONARY ARTERY DISEASE, UNSPECIFIED VESSEL OR LESION TYPE, UNSPECIFIED WHETHER ANGINA PRESENT, UNSPECIFIED WHETHER NATIVE OR TRANSPLANTED HEART: Primary | ICD-10-CM

## 2023-01-20 DIAGNOSIS — Z98.61 HISTORY OF PTCA: ICD-10-CM

## 2023-01-20 DIAGNOSIS — I25.118 CORONARY ARTERY DISEASE OF NATIVE ARTERY OF NATIVE HEART WITH STABLE ANGINA PECTORIS: ICD-10-CM

## 2023-01-20 DIAGNOSIS — Z95.5 STATUS POST INSERTION OF DRUG-ELUTING STENT INTO LEFT ANTERIOR DESCENDING (LAD) ARTERY: ICD-10-CM

## 2023-01-20 DIAGNOSIS — I25.10 CORONARY ARTERY DISEASE, UNSPECIFIED VESSEL OR LESION TYPE, UNSPECIFIED WHETHER ANGINA PRESENT, UNSPECIFIED WHETHER NATIVE OR TRANSPLANTED HEART: ICD-10-CM

## 2023-01-20 DIAGNOSIS — I10 PRIMARY HYPERTENSION: ICD-10-CM

## 2023-01-20 DIAGNOSIS — E66.3 OVERWEIGHT: ICD-10-CM

## 2023-01-20 DIAGNOSIS — I25.2 OLD MI (MYOCARDIAL INFARCTION): ICD-10-CM

## 2023-01-20 PROCEDURE — 3079F DIAST BP 80-89 MM HG: CPT | Mod: CPTII,S$GLB,, | Performed by: INTERNAL MEDICINE

## 2023-01-20 PROCEDURE — 3074F PR MOST RECENT SYSTOLIC BLOOD PRESSURE < 130 MM HG: ICD-10-PCS | Mod: CPTII,S$GLB,, | Performed by: INTERNAL MEDICINE

## 2023-01-20 PROCEDURE — 3008F PR BODY MASS INDEX (BMI) DOCUMENTED: ICD-10-PCS | Mod: CPTII,S$GLB,, | Performed by: INTERNAL MEDICINE

## 2023-01-20 PROCEDURE — 99999 PR PBB SHADOW E&M-EST. PATIENT-LVL II: ICD-10-PCS | Mod: PBBFAC,,, | Performed by: INTERNAL MEDICINE

## 2023-01-20 PROCEDURE — 99214 OFFICE O/P EST MOD 30 MIN: CPT | Mod: S$GLB,,, | Performed by: INTERNAL MEDICINE

## 2023-01-20 PROCEDURE — 99214 PR OFFICE/OUTPT VISIT, EST, LEVL IV, 30-39 MIN: ICD-10-PCS | Mod: S$GLB,,, | Performed by: INTERNAL MEDICINE

## 2023-01-20 PROCEDURE — 3008F BODY MASS INDEX DOCD: CPT | Mod: CPTII,S$GLB,, | Performed by: INTERNAL MEDICINE

## 2023-01-20 PROCEDURE — 93010 EKG 12-LEAD: ICD-10-PCS | Mod: ,,, | Performed by: INTERNAL MEDICINE

## 2023-01-20 PROCEDURE — 93010 ELECTROCARDIOGRAM REPORT: CPT | Mod: ,,, | Performed by: INTERNAL MEDICINE

## 2023-01-20 PROCEDURE — 99999 PR PBB SHADOW E&M-EST. PATIENT-LVL II: CPT | Mod: PBBFAC,,, | Performed by: INTERNAL MEDICINE

## 2023-01-20 PROCEDURE — 4010F PR ACE/ARB THEARPY RXD/TAKEN: ICD-10-PCS | Mod: CPTII,S$GLB,, | Performed by: INTERNAL MEDICINE

## 2023-01-20 PROCEDURE — 1159F PR MEDICATION LIST DOCUMENTED IN MEDICAL RECORD: ICD-10-PCS | Mod: CPTII,S$GLB,, | Performed by: INTERNAL MEDICINE

## 2023-01-20 PROCEDURE — 3079F PR MOST RECENT DIASTOLIC BLOOD PRESSURE 80-89 MM HG: ICD-10-PCS | Mod: CPTII,S$GLB,, | Performed by: INTERNAL MEDICINE

## 2023-01-20 PROCEDURE — 1159F MED LIST DOCD IN RCRD: CPT | Mod: CPTII,S$GLB,, | Performed by: INTERNAL MEDICINE

## 2023-01-20 PROCEDURE — 1160F PR REVIEW ALL MEDS BY PRESCRIBER/CLIN PHARMACIST DOCUMENTED: ICD-10-PCS | Mod: CPTII,S$GLB,, | Performed by: INTERNAL MEDICINE

## 2023-01-20 PROCEDURE — 1160F RVW MEDS BY RX/DR IN RCRD: CPT | Mod: CPTII,S$GLB,, | Performed by: INTERNAL MEDICINE

## 2023-01-20 PROCEDURE — 4010F ACE/ARB THERAPY RXD/TAKEN: CPT | Mod: CPTII,S$GLB,, | Performed by: INTERNAL MEDICINE

## 2023-01-20 PROCEDURE — 3074F SYST BP LT 130 MM HG: CPT | Mod: CPTII,S$GLB,, | Performed by: INTERNAL MEDICINE

## 2023-01-20 PROCEDURE — 93005 ELECTROCARDIOGRAM TRACING: CPT

## 2023-01-20 NOTE — PROGRESS NOTES
Subjective:    Patient ID:  Harry Phan is a 72 y.o. male who presents for evaluation of Hypertension, Hyperlipidemia, and Coronary Artery Disease      HPI Pt presents for eval.   His current medical conditions include CAD, HTN, dyslipidemia.   Nonsmoker.   Past hx pertinent for following:  s/p NSTEMI 12/08 and was found to have a 100% chronic occluded mid-LCX artery and a 95% mid-RCA lesion (culprit vessel) and underwent successful PCI with a 4 x 23 mm Vision BMS.   Medical mgt was advised for his occluded LCX. He also was noted to have nonobstructive LAD disease at the time.  Pt seen 8/18 for recurrent exertional angina and had C next day with PCI performed of critical 95% prox LAD stenosis with Stent Resolute MELE x one,  LCX and patent RCA stent, normal EF.  Echo 9/20 normal LVEF, grade I DD.  Nuclear stress MPI 9/20 normal perfusion, normal EF.   ecg 10/8/21 NSR, normal ECG.  Now here.  CAD is stable.  Angina controlled on current med tx.  No active CP sxs.  He is active, regular exercise.  No CHF sxs.  Ecg today 1/20/23 NSR, normal ECG.  HTN log book reviewed.  BP at goal.  Weight is stable, down a few pounds.  Has cut back on caffeine.  Compliant w meds.  Lipids are excellent on statin tx.      Past Medical History:   Diagnosis Date    Acute coronary syndrome     Coronary artery disease     Hyperlipidemia     Hypertension     Old MI (myocardial infarction) 4/17/2014    Status post insertion of drug-eluting stent into left anterior descending (LAD) artery 8/3/2018       Current Outpatient Medications:     aspirin (ECOTRIN) 81 MG EC tablet, Take 81 mg by mouth once daily., Disp: , Rfl:     atorvastatin (LIPITOR) 80 MG tablet, TAKE 1 TABLET EVERY EVENING, Disp: 90 tablet, Rfl: 3    isosorbide mononitrate (IMDUR) 30 MG 24 hr tablet, TAKE 1 TABLET EVERY DAY, Disp: 90 tablet, Rfl: 3    metoprolol tartrate (LOPRESSOR) 50 MG tablet, TAKE 1 TABLET TWICE DAILY, Disp: 180 tablet, Rfl: 3    niacin (NIASPAN  EXTENDED-RELEASE) 750 MG CR tablet, Take 1 tablet (750 mg total) by mouth 2 (two) times daily., Disp: 180 tablet, Rfl: 3    nitroGLYCERIN (NITROSTAT) 0.4 MG SL tablet, Place 1 tablet (0.4 mg total) under the tongue every 5 (five) minutes as needed., Disp: 20 tablet, Rfl: 12    prasugreL (EFFIENT) 5 mg Tab, TAKE 1 TABLET EVERY DAY, Disp: 90 tablet, Rfl: 4    ramipriL (ALTACE) 5 MG capsule, TAKE 1 CAPSULE EVERY DAY, Disp: 90 capsule, Rfl: 3      Review of Systems   Constitutional: Positive for weight loss.   HENT: Negative.     Eyes: Negative.    Cardiovascular: Negative.    Respiratory: Negative.     Endocrine: Negative.    Hematologic/Lymphatic: Negative.    Skin: Negative.    Musculoskeletal: Negative.    Gastrointestinal: Negative.    Genitourinary: Negative.    Neurological: Negative.    Psychiatric/Behavioral: Negative.     Allergic/Immunologic: Negative.         /80   Pulse 68   Resp 16   Wt 90.1 kg (198 lb 10.2 oz)   BMI 28.50 kg/m²       Wt Readings from Last 3 Encounters:   01/20/23 90.1 kg (198 lb 10.2 oz)   09/30/22 91.4 kg (201 lb 8 oz)   03/30/22 91.7 kg (202 lb 2.6 oz)     Temp Readings from Last 3 Encounters:   08/03/18 96.7 °F (35.9 °C) (Oral)     BP Readings from Last 3 Encounters:   01/20/23 126/80   09/30/22 (!) 146/80   03/30/22 (!) 142/80     Pulse Readings from Last 3 Encounters:   01/20/23 68   09/30/22 75   03/30/22 67       Objective:    Physical Exam  Vitals and nursing note reviewed.   Constitutional:       Appearance: He is well-developed.   HENT:      Head: Normocephalic.   Neck:      Thyroid: No thyromegaly.      Vascular: Normal carotid pulses. No carotid bruit or JVD.   Cardiovascular:      Rate and Rhythm: Normal rate and regular rhythm.      Pulses:           Radial pulses are 2+ on the right side and 2+ on the left side.      Heart sounds: S1 normal and S2 normal. Heart sounds not distant. No midsystolic click and no opening snap. No murmur heard.    No friction rub. No S3  or S4 sounds.   Pulmonary:      Effort: Pulmonary effort is normal.      Breath sounds: Normal breath sounds. No wheezing or rales.   Abdominal:      General: Bowel sounds are normal. There is no distension or abdominal bruit.      Palpations: Abdomen is soft. There is no mass.      Tenderness: There is no abdominal tenderness.   Musculoskeletal:      Cervical back: Neck supple.   Skin:     General: Skin is warm.   Neurological:      Mental Status: He is alert and oriented to person, place, and time.   Psychiatric:         Behavior: Behavior normal.     I have reviewed all pertinent labs and cardiac studies.      Chemistry        Component Value Date/Time     02/13/2020 0932    K 4.7 02/13/2020 0932     02/13/2020 0932    CO2 23 02/13/2020 0932    BUN 13 02/13/2020 0932    CREATININE 1.0 02/13/2020 0932    GLU 84 02/13/2020 0932        Component Value Date/Time    CALCIUM 9.4 02/13/2020 0932    ALKPHOS 71 02/13/2020 0932    AST 25 02/13/2020 0932    ALT 19 02/13/2020 0932    BILITOT 1.2 (H) 02/13/2020 0932    ESTGFRAFRICA >60.0 02/13/2020 0932    EGFRNONAA >60.0 02/13/2020 0932        Lab Results   Component Value Date    WBC 6.94 08/03/2018    HGB 13.6 (L) 08/03/2018    HCT 40.8 08/03/2018    MCV 92 08/03/2018     (L) 08/03/2018       No results found for: LABA1C, HGBA1C  Lab Results   Component Value Date    CHOL 103 (L) 02/13/2020    CHOL 109 (L) 06/25/2019    CHOL 106 (L) 11/02/2018     Lab Results   Component Value Date    HDL 38 (L) 02/13/2020    HDL 40 06/25/2019    HDL 35 (L) 11/02/2018     Lab Results   Component Value Date    LDLCALC 53.8 (L) 02/13/2020    LDLCALC 58.0 (L) 06/25/2019    LDLCALC 55.4 (L) 11/02/2018     Lab Results   Component Value Date    TRIG 56 02/13/2020    TRIG 55 06/25/2019    TRIG 78 11/02/2018     Lab Results   Component Value Date    CHOLHDL 36.9 02/13/2020    CHOLHDL 36.7 06/25/2019    CHOLHDL 33.0 11/02/2018           Assessment:       1. CAD, multiple vessel     2. AP (angina pectoris)    3. Coronary artery disease of native artery of native heart with stable angina pectoris    4. Status post insertion of drug-eluting stent into left anterior descending (LAD) artery    5. Overweight    6. Old MI (myocardial infarction)    7. Primary hypertension    8. Mixed hyperlipidemia    9. History of PTCA         Plan:             Stable cardiovascular conditions at present time on current medical treatment.  Continue OMT for CAD.  Continue DAPT.  Reviewed all tests and above medical conditions with patient in detail and formulated treatment plan.  Continue optimal medical treatment for cardiovascular conditions.  Cardiac low salt diet advised.  Daily exercise encouraged, with the goal 30 +  minutes aerobic exercise as tolerated.  Maintaining healthy weight and weight loss goals (if needed) were discussed in clinic.  Need for BP control and HTN goals (if needed) were discussed and tx plan formulated.  Importance of optimal lipid control were discussed in detail as well as possible pharmacologic and lifestyle changes that may be needed.  Statin tx.   F/u in 6 months.    I have reviewed all pertinent labs and cardiac studies independently. Plans and recommendations have been formulated under my direct supervision. All questions answered and patient voiced understanding.

## 2023-07-26 ENCOUNTER — OFFICE VISIT (OUTPATIENT)
Dept: CARDIOLOGY | Facility: CLINIC | Age: 73
End: 2023-07-26
Payer: MEDICARE

## 2023-07-26 VITALS
DIASTOLIC BLOOD PRESSURE: 80 MMHG | HEIGHT: 70 IN | HEART RATE: 65 BPM | OXYGEN SATURATION: 98 % | BODY MASS INDEX: 28.81 KG/M2 | SYSTOLIC BLOOD PRESSURE: 130 MMHG | WEIGHT: 201.25 LBS

## 2023-07-26 DIAGNOSIS — I25.2 OLD MI (MYOCARDIAL INFARCTION): ICD-10-CM

## 2023-07-26 DIAGNOSIS — I20.9 AP (ANGINA PECTORIS): ICD-10-CM

## 2023-07-26 DIAGNOSIS — I25.118 CORONARY ARTERY DISEASE OF NATIVE ARTERY OF NATIVE HEART WITH STABLE ANGINA PECTORIS: ICD-10-CM

## 2023-07-26 DIAGNOSIS — E66.3 OVERWEIGHT: ICD-10-CM

## 2023-07-26 DIAGNOSIS — Z98.61 HISTORY OF PTCA: ICD-10-CM

## 2023-07-26 DIAGNOSIS — Z95.5 STATUS POST INSERTION OF DRUG-ELUTING STENT INTO LEFT ANTERIOR DESCENDING (LAD) ARTERY: Primary | ICD-10-CM

## 2023-07-26 DIAGNOSIS — E78.2 MIXED HYPERLIPIDEMIA: ICD-10-CM

## 2023-07-26 DIAGNOSIS — I10 PRIMARY HYPERTENSION: ICD-10-CM

## 2023-07-26 DIAGNOSIS — I25.10 CAD, MULTIPLE VESSEL: ICD-10-CM

## 2023-07-26 PROCEDURE — 3075F SYST BP GE 130 - 139MM HG: CPT | Mod: CPTII,S$GLB,, | Performed by: INTERNAL MEDICINE

## 2023-07-26 PROCEDURE — 1159F MED LIST DOCD IN RCRD: CPT | Mod: CPTII,S$GLB,, | Performed by: INTERNAL MEDICINE

## 2023-07-26 PROCEDURE — 1126F AMNT PAIN NOTED NONE PRSNT: CPT | Mod: CPTII,S$GLB,, | Performed by: INTERNAL MEDICINE

## 2023-07-26 PROCEDURE — 1160F PR REVIEW ALL MEDS BY PRESCRIBER/CLIN PHARMACIST DOCUMENTED: ICD-10-PCS | Mod: CPTII,S$GLB,, | Performed by: INTERNAL MEDICINE

## 2023-07-26 PROCEDURE — 4010F PR ACE/ARB THEARPY RXD/TAKEN: ICD-10-PCS | Mod: CPTII,S$GLB,, | Performed by: INTERNAL MEDICINE

## 2023-07-26 PROCEDURE — 3079F DIAST BP 80-89 MM HG: CPT | Mod: CPTII,S$GLB,, | Performed by: INTERNAL MEDICINE

## 2023-07-26 PROCEDURE — 1160F RVW MEDS BY RX/DR IN RCRD: CPT | Mod: CPTII,S$GLB,, | Performed by: INTERNAL MEDICINE

## 2023-07-26 PROCEDURE — 99999 PR PBB SHADOW E&M-EST. PATIENT-LVL III: CPT | Mod: PBBFAC,,, | Performed by: INTERNAL MEDICINE

## 2023-07-26 PROCEDURE — 3008F PR BODY MASS INDEX (BMI) DOCUMENTED: ICD-10-PCS | Mod: CPTII,S$GLB,, | Performed by: INTERNAL MEDICINE

## 2023-07-26 PROCEDURE — 4010F ACE/ARB THERAPY RXD/TAKEN: CPT | Mod: CPTII,S$GLB,, | Performed by: INTERNAL MEDICINE

## 2023-07-26 PROCEDURE — 1126F PR PAIN SEVERITY QUANTIFIED, NO PAIN PRESENT: ICD-10-PCS | Mod: CPTII,S$GLB,, | Performed by: INTERNAL MEDICINE

## 2023-07-26 PROCEDURE — 1159F PR MEDICATION LIST DOCUMENTED IN MEDICAL RECORD: ICD-10-PCS | Mod: CPTII,S$GLB,, | Performed by: INTERNAL MEDICINE

## 2023-07-26 PROCEDURE — 99214 OFFICE O/P EST MOD 30 MIN: CPT | Mod: S$GLB,,, | Performed by: INTERNAL MEDICINE

## 2023-07-26 PROCEDURE — 99999 PR PBB SHADOW E&M-EST. PATIENT-LVL III: ICD-10-PCS | Mod: PBBFAC,,, | Performed by: INTERNAL MEDICINE

## 2023-07-26 PROCEDURE — 3079F PR MOST RECENT DIASTOLIC BLOOD PRESSURE 80-89 MM HG: ICD-10-PCS | Mod: CPTII,S$GLB,, | Performed by: INTERNAL MEDICINE

## 2023-07-26 PROCEDURE — 3008F BODY MASS INDEX DOCD: CPT | Mod: CPTII,S$GLB,, | Performed by: INTERNAL MEDICINE

## 2023-07-26 PROCEDURE — 99214 PR OFFICE/OUTPT VISIT, EST, LEVL IV, 30-39 MIN: ICD-10-PCS | Mod: S$GLB,,, | Performed by: INTERNAL MEDICINE

## 2023-07-26 PROCEDURE — 3075F PR MOST RECENT SYSTOLIC BLOOD PRESS GE 130-139MM HG: ICD-10-PCS | Mod: CPTII,S$GLB,, | Performed by: INTERNAL MEDICINE

## 2023-07-26 NOTE — PROGRESS NOTES
Subjective:    Patient ID:  Harry Phan is a 72 y.o. male who presents for evaluation of Hypertension, Hyperlipidemia, Coronary Artery Disease, and Risk Factor Management For Atherosclerosis      HPI Pt presents for eval.   His current medical conditions include CAD, HTN, dyslipidemia.   Nonsmoker.   Past hx pertinent for following:  s/p NSTEMI 12/08 and was found to have a 100% chronic occluded mid-LCX artery and a 95% mid-RCA lesion (culprit vessel) and underwent successful PCI with a 4 x 23 mm Vision BMS.   Medical mgt was advised for his occluded LCX. He also was noted to have nonobstructive LAD disease at the time.  Pt seen 8/18 for recurrent exertional angina and had LHC next day with PCI performed of critical 95% prox LAD stenosis with Stent Resolute MELE x one,  LCX and patent RCA stent, normal EF.  Echo 9/20 normal LVEF, grade I DD.  Nuclear stress MPI 9/20 normal perfusion, normal EF.   Ecg 1/20/23 NSR, normal ECG.  Now here.  CAD is stable.  Angina controlled on current med tx.  No acute angina/CP sxs.  No CHF sxs.  HTN is controlled on current med tx.  Weight overall stable.  Active.  Lipids controlled on current med tx.  Lipids 4/23 from PCP reviewed and are perfect w LDL 53.  Compliant w meds.  Contemplating knee surgery end of year or early next year.        Past Medical History:   Diagnosis Date    Acute coronary syndrome     Coronary artery disease     Hyperlipidemia     Hypertension     Old MI (myocardial infarction) 4/17/2014    Status post insertion of drug-eluting stent into left anterior descending (LAD) artery 8/3/2018       Current Outpatient Medications:     aspirin (ECOTRIN) 81 MG EC tablet, Take 81 mg by mouth once daily., Disp: , Rfl:     atorvastatin (LIPITOR) 80 MG tablet, TAKE 1 TABLET EVERY EVENING, Disp: 90 tablet, Rfl: 3    isosorbide mononitrate (IMDUR) 30 MG 24 hr tablet, TAKE 1 TABLET EVERY DAY, Disp: 90 tablet, Rfl: 3    metoprolol tartrate (LOPRESSOR) 50 MG tablet, TAKE  "1 TABLET TWICE DAILY, Disp: 180 tablet, Rfl: 3    niacin (NIASPAN EXTENDED-RELEASE) 750 MG CR tablet, Take 1 tablet (750 mg total) by mouth 2 (two) times daily., Disp: 180 tablet, Rfl: 3    nitroGLYCERIN (NITROSTAT) 0.4 MG SL tablet, Place 1 tablet (0.4 mg total) under the tongue every 5 (five) minutes as needed., Disp: 20 tablet, Rfl: 12    prasugreL (EFFIENT) 5 mg Tab, TAKE 1 TABLET EVERY DAY, Disp: 90 tablet, Rfl: 4    ramipriL (ALTACE) 5 MG capsule, TAKE 1 CAPSULE EVERY DAY, Disp: 90 capsule, Rfl: 3      Review of Systems   Constitutional: Negative.   HENT: Negative.     Eyes: Negative.    Cardiovascular: Negative.    Respiratory: Negative.     Endocrine: Negative.    Hematologic/Lymphatic: Negative.    Skin: Negative.    Musculoskeletal:  Positive for arthritis and joint pain.   Gastrointestinal: Negative.    Genitourinary: Negative.    Neurological: Negative.    Psychiatric/Behavioral: Negative.     Allergic/Immunologic: Negative.         /80 (BP Location: Right arm, Patient Position: Sitting, BP Method: Large (Manual))   Pulse 65   Ht 5' 10" (1.778 m)   Wt 91.3 kg (201 lb 4.5 oz)   SpO2 98%   BMI 28.88 kg/m²       Wt Readings from Last 3 Encounters:   07/26/23 91.3 kg (201 lb 4.5 oz)   01/20/23 90.1 kg (198 lb 10.2 oz)   09/30/22 91.4 kg (201 lb 8 oz)     Temp Readings from Last 3 Encounters:   08/03/18 96.7 °F (35.9 °C) (Oral)     BP Readings from Last 3 Encounters:   07/26/23 130/80   01/20/23 126/80   09/30/22 (!) 146/80     Pulse Readings from Last 3 Encounters:   07/26/23 65   01/20/23 68   09/30/22 75       Objective:    Physical Exam  Vitals and nursing note reviewed.   Constitutional:       Appearance: He is well-developed.   HENT:      Head: Normocephalic.   Neck:      Thyroid: No thyromegaly.      Vascular: Normal carotid pulses. No carotid bruit or JVD.   Cardiovascular:      Rate and Rhythm: Normal rate and regular rhythm.      Pulses:           Radial pulses are 2+ on the right side " and 2+ on the left side.      Heart sounds: S1 normal and S2 normal. Heart sounds not distant. No midsystolic click and no opening snap. No murmur heard.    No friction rub. No S3 or S4 sounds.   Pulmonary:      Effort: Pulmonary effort is normal.      Breath sounds: Normal breath sounds. No wheezing or rales.   Abdominal:      General: Bowel sounds are normal. There is no distension or abdominal bruit.      Palpations: Abdomen is soft. There is no mass.      Tenderness: There is no abdominal tenderness.   Musculoskeletal:      Cervical back: Neck supple.   Skin:     General: Skin is warm.   Neurological:      Mental Status: He is alert and oriented to person, place, and time.   Psychiatric:         Behavior: Behavior normal.     I have reviewed all pertinent labs and cardiac studies.      Assessment:       1. Status post insertion of drug-eluting stent into left anterior descending (LAD) artery    2. Overweight    3. Old MI (myocardial infarction)    4. Mixed hyperlipidemia    5. Primary hypertension    6. History of PTCA    7. Coronary artery disease of native artery of native heart with stable angina pectoris    8. CAD, multiple vessel    9. AP (angina pectoris)         Plan:             Stable cardiovascular conditions at present time on current medical treatment.  CAD: Stable. Continue OMT for CAD.  Continue DAPT as tolerated.  Reviewed all tests and above medical conditions with patient in detail and formulated treatment plan.  Continue optimal medical treatment for cardiovascular conditions.  Cardiac low salt diet advised.  Daily exercise encouraged, with the goal 30 +  minutes aerobic exercise as tolerated.  Maintaining healthy weight and weight loss goals (if needed) were discussed in clinic.  HTN: Need for BP control and HTN goals (if needed) were discussed and tx plan formulated.  Goal < 130/80.  Continue current HTN meds.  Home BP monitoring.  Lipids: Importance of optimal lipid control were discussed in  detail as well as possible pharmacologic and lifestyle changes that may be needed.  Continue statin tx.   F/u in 6 months w stress test + nuclear stress test.    I have reviewed all pertinent labs and cardiac studies independently. Plans and recommendations have been formulated under my direct supervision. All questions answered and patient voiced understanding.

## 2023-08-09 DIAGNOSIS — I10 PRIMARY HYPERTENSION: ICD-10-CM

## 2023-08-09 RX ORDER — RAMIPRIL 5 MG/1
CAPSULE ORAL
Qty: 90 CAPSULE | Refills: 3 | Status: SHIPPED | OUTPATIENT
Start: 2023-08-09

## 2023-08-29 DIAGNOSIS — E78.2 MIXED HYPERLIPIDEMIA: ICD-10-CM

## 2023-08-29 DIAGNOSIS — I10 ESSENTIAL HYPERTENSION: ICD-10-CM

## 2023-08-29 DIAGNOSIS — I25.10 CORONARY ARTERY DISEASE: ICD-10-CM

## 2023-08-30 RX ORDER — METOPROLOL TARTRATE 50 MG/1
TABLET ORAL
Qty: 180 TABLET | Refills: 3 | Status: SHIPPED | OUTPATIENT
Start: 2023-08-30

## 2023-08-30 RX ORDER — ATORVASTATIN CALCIUM 80 MG/1
TABLET, FILM COATED ORAL
Qty: 90 TABLET | Refills: 3 | Status: SHIPPED | OUTPATIENT
Start: 2023-08-30

## 2024-01-19 ENCOUNTER — HOSPITAL ENCOUNTER (OUTPATIENT)
Dept: CARDIOLOGY | Facility: HOSPITAL | Age: 74
Discharge: HOME | End: 2024-01-19
Attending: INTERNAL MEDICINE
Payer: MEDICARE

## 2024-01-19 ENCOUNTER — HOSPITAL ENCOUNTER (OUTPATIENT)
Dept: RADIOLOGY | Facility: HOSPITAL | Age: 74
Discharge: HOME | End: 2024-01-19
Attending: INTERNAL MEDICINE
Payer: MEDICARE

## 2024-01-19 VITALS
BODY MASS INDEX: 28.77 KG/M2 | HEIGHT: 70 IN | DIASTOLIC BLOOD PRESSURE: 80 MMHG | WEIGHT: 201 LBS | SYSTOLIC BLOOD PRESSURE: 126 MMHG

## 2024-01-19 DIAGNOSIS — E66.3 OVERWEIGHT: ICD-10-CM

## 2024-01-19 DIAGNOSIS — I20.9 AP (ANGINA PECTORIS): ICD-10-CM

## 2024-01-19 DIAGNOSIS — Z95.5 STATUS POST INSERTION OF DRUG-ELUTING STENT INTO LEFT ANTERIOR DESCENDING (LAD) ARTERY: ICD-10-CM

## 2024-01-19 DIAGNOSIS — I25.10 CAD, MULTIPLE VESSEL: ICD-10-CM

## 2024-01-19 DIAGNOSIS — I25.2 OLD MI (MYOCARDIAL INFARCTION): ICD-10-CM

## 2024-01-19 DIAGNOSIS — I25.118 CORONARY ARTERY DISEASE OF NATIVE ARTERY OF NATIVE HEART WITH STABLE ANGINA PECTORIS: ICD-10-CM

## 2024-01-19 DIAGNOSIS — I10 PRIMARY HYPERTENSION: ICD-10-CM

## 2024-01-19 DIAGNOSIS — Z98.61 HISTORY OF PTCA: ICD-10-CM

## 2024-01-19 LAB
AORTIC ROOT ANNULUS: 3.37 CM
AV INDEX (PROSTH): 0.73
AV MEAN GRADIENT: 4 MMHG
AV PEAK GRADIENT: 8 MMHG
AV VALVE AREA BY VELOCITY RATIO: 2.69 CM²
AV VALVE AREA: 2.37 CM²
AV VELOCITY RATIO: 0.83
BSA FOR ECHO PROCEDURE: 2.12 M2
CV ECHO LV RWT: 0.49 CM
CV STRESS BASE HR: 62 BPM
DIASTOLIC BLOOD PRESSURE: 62 MMHG
DOP CALC AO PEAK VEL: 1.37 M/S
DOP CALC AO VTI: 33.3 CM
DOP CALC LVOT AREA: 3.2 CM2
DOP CALC LVOT DIAMETER: 2.03 CM
DOP CALC LVOT PEAK VEL: 1.14 M/S
DOP CALC LVOT STROKE VOLUME: 78.93 CM3
DOP CALC RVOT PEAK VEL: 0.75 M/S
DOP CALC RVOT VTI: 21.1 CM
DOP CALCLVOT PEAK VEL VTI: 24.4 CM
E WAVE DECELERATION TIME: 235.99 MSEC
E/A RATIO: 1
E/E' RATIO: 10 M/S
ECHO LV POSTERIOR WALL: 1.07 CM (ref 0.6–1.1)
FRACTIONAL SHORTENING: 27 % (ref 28–44)
INTERVENTRICULAR SEPTUM: 0.94 CM (ref 0.6–1.1)
IVRT: 110.37 MSEC
LA MAJOR: 4.68 CM
LA MINOR: 5.11 CM
LA WIDTH: 3.6 CM
LEFT ATRIUM SIZE: 4.26 CM
LEFT ATRIUM VOLUME INDEX MOD: 23.4 ML/M2
LEFT ATRIUM VOLUME INDEX: 30.5 ML/M2
LEFT ATRIUM VOLUME MOD: 48.86 CM3
LEFT ATRIUM VOLUME: 63.69 CM3
LEFT INTERNAL DIMENSION IN SYSTOLE: 3.21 CM (ref 2.1–4)
LEFT VENTRICLE DIASTOLIC VOLUME INDEX: 42.11 ML/M2
LEFT VENTRICLE DIASTOLIC VOLUME: 88 ML
LEFT VENTRICLE MASS INDEX: 71 G/M2
LEFT VENTRICLE SYSTOLIC VOLUME INDEX: 19.7 ML/M2
LEFT VENTRICLE SYSTOLIC VOLUME: 41.27 ML
LEFT VENTRICULAR INTERNAL DIMENSION IN DIASTOLE: 4.41 CM (ref 3.5–6)
LEFT VENTRICULAR MASS: 149.4 G
LV LATERAL E/E' RATIO: 9.44 M/S
LV SEPTAL E/E' RATIO: 10.63 M/S
LVOT MG: 2.5 MMHG
LVOT MV: 0.73 CM/S
MV PEAK A VEL: 0.85 M/S
MV PEAK E VEL: 0.85 M/S
NUC REST EJECTION FRACTION: 68
NUC STRESS EJECTION FRACTION: 73 %
OHS CV CPX 85 PERCENT MAX PREDICTED HEART RATE MALE: 127
OHS CV CPX MAX PREDICTED HEART RATE: 149
OHS CV CPX PATIENT IS FEMALE: 0
OHS CV CPX PATIENT IS MALE: 1
OHS CV CPX PEAK DIASTOLIC BLOOD PRESSURE: 64 MMHG
OHS CV CPX PEAK HEAR RATE: 88 BPM
OHS CV CPX PEAK RATE PRESSURE PRODUCT: NORMAL
OHS CV CPX PEAK SYSTOLIC BLOOD PRESSURE: 120 MMHG
OHS CV CPX PERCENT MAX PREDICTED HEART RATE ACHIEVED: 59
OHS CV CPX RATE PRESSURE PRODUCT PRESENTING: 7936
PISA TR MAX VEL: 2.28 M/S
PULM VEIN S/D RATIO: 1.28
PV MEAN GRADIENT: 1 MMHG
PV PEAK D VEL: 0.5 M/S
PV PEAK GRADIENT: 2
PV PEAK S VEL: 0.64 M/S
RA MAJOR: 4.52 CM
RA PRESSURE ESTIMATED: 3 MMHG
RA WIDTH: 2.95 CM
RIGHT VENTRICULAR END-DIASTOLIC DIMENSION: 2.43 CM
RV TB RVSP: 5 MMHG
SINUS: 3.17 CM
STJ: 3.26 CM
SYSTOLIC BLOOD PRESSURE: 128 MMHG
TDI LATERAL: 0.09 M/S
TDI SEPTAL: 0.08 M/S
TDI: 0.09 M/S
TR MAX PG: 21 MMHG
TV REST PULMONARY ARTERY PRESSURE: 24 MMHG
Z-SCORE OF LEFT VENTRICULAR DIMENSION IN END DIASTOLE: -3.8
Z-SCORE OF LEFT VENTRICULAR DIMENSION IN END SYSTOLE: -1.62

## 2024-01-19 PROCEDURE — 63600175 PHARM REV CODE 636 W HCPCS: Performed by: INTERNAL MEDICINE

## 2024-01-19 PROCEDURE — 93016 CV STRESS TEST SUPVJ ONLY: CPT | Mod: ,,, | Performed by: INTERNAL MEDICINE

## 2024-01-19 PROCEDURE — 93306 TTE W/DOPPLER COMPLETE: CPT | Mod: 26,,, | Performed by: INTERNAL MEDICINE

## 2024-01-19 PROCEDURE — 78452 HT MUSCLE IMAGE SPECT MULT: CPT | Mod: 26,,, | Performed by: INTERNAL MEDICINE

## 2024-01-19 PROCEDURE — 93017 CV STRESS TEST TRACING ONLY: CPT

## 2024-01-19 PROCEDURE — 93018 CV STRESS TEST I&R ONLY: CPT | Mod: ,,, | Performed by: INTERNAL MEDICINE

## 2024-01-19 PROCEDURE — 93306 TTE W/DOPPLER COMPLETE: CPT

## 2024-01-19 RX ORDER — REGADENOSON 0.08 MG/ML
0.4 INJECTION, SOLUTION INTRAVENOUS ONCE
Status: COMPLETED | OUTPATIENT
Start: 2024-01-19 | End: 2024-01-19

## 2024-01-19 RX ADMIN — REGADENOSON 0.4 MG: 0.08 INJECTION, SOLUTION INTRAVENOUS at 12:01

## 2024-01-26 ENCOUNTER — OFFICE VISIT (OUTPATIENT)
Dept: CARDIOLOGY | Facility: CLINIC | Age: 74
End: 2024-01-26
Payer: MEDICARE

## 2024-01-26 VITALS
SYSTOLIC BLOOD PRESSURE: 124 MMHG | HEART RATE: 67 BPM | BODY MASS INDEX: 28.66 KG/M2 | HEIGHT: 70 IN | DIASTOLIC BLOOD PRESSURE: 70 MMHG | OXYGEN SATURATION: 96 % | WEIGHT: 200.19 LBS

## 2024-01-26 DIAGNOSIS — Z98.61 HISTORY OF PTCA: ICD-10-CM

## 2024-01-26 DIAGNOSIS — E78.2 MIXED HYPERLIPIDEMIA: ICD-10-CM

## 2024-01-26 DIAGNOSIS — I20.9 AP (ANGINA PECTORIS): ICD-10-CM

## 2024-01-26 DIAGNOSIS — I25.2 OLD MI (MYOCARDIAL INFARCTION): ICD-10-CM

## 2024-01-26 DIAGNOSIS — Z95.5 STATUS POST INSERTION OF DRUG-ELUTING STENT INTO LEFT ANTERIOR DESCENDING (LAD) ARTERY: ICD-10-CM

## 2024-01-26 DIAGNOSIS — I10 PRIMARY HYPERTENSION: ICD-10-CM

## 2024-01-26 DIAGNOSIS — E66.3 OVERWEIGHT: ICD-10-CM

## 2024-01-26 DIAGNOSIS — I25.10 CAD, MULTIPLE VESSEL: ICD-10-CM

## 2024-01-26 DIAGNOSIS — Z01.810 PREOP CARDIOVASCULAR EXAM: ICD-10-CM

## 2024-01-26 DIAGNOSIS — I25.118 CORONARY ARTERY DISEASE OF NATIVE ARTERY OF NATIVE HEART WITH STABLE ANGINA PECTORIS: Primary | ICD-10-CM

## 2024-01-26 PROCEDURE — 1160F RVW MEDS BY RX/DR IN RCRD: CPT | Mod: CPTII,S$GLB,, | Performed by: INTERNAL MEDICINE

## 2024-01-26 PROCEDURE — 3078F DIAST BP <80 MM HG: CPT | Mod: CPTII,S$GLB,, | Performed by: INTERNAL MEDICINE

## 2024-01-26 PROCEDURE — 99214 OFFICE O/P EST MOD 30 MIN: CPT | Mod: S$GLB,,, | Performed by: INTERNAL MEDICINE

## 2024-01-26 PROCEDURE — 99999 PR PBB SHADOW E&M-EST. PATIENT-LVL II: CPT | Mod: PBBFAC,,, | Performed by: INTERNAL MEDICINE

## 2024-01-26 PROCEDURE — 3008F BODY MASS INDEX DOCD: CPT | Mod: CPTII,S$GLB,, | Performed by: INTERNAL MEDICINE

## 2024-01-26 PROCEDURE — 3074F SYST BP LT 130 MM HG: CPT | Mod: CPTII,S$GLB,, | Performed by: INTERNAL MEDICINE

## 2024-01-26 PROCEDURE — 4010F ACE/ARB THERAPY RXD/TAKEN: CPT | Mod: CPTII,S$GLB,, | Performed by: INTERNAL MEDICINE

## 2024-01-26 PROCEDURE — 1159F MED LIST DOCD IN RCRD: CPT | Mod: CPTII,S$GLB,, | Performed by: INTERNAL MEDICINE

## 2024-01-26 NOTE — PROGRESS NOTES
Subjective:    Patient ID:  Harry Phan is a 73 y.o. male who presents for evaluation of Hypertension, Hyperlipidemia, and Coronary Artery Disease      HPI Pt presents for eval.   His current medical conditions include CAD, HTN, dyslipidemia.   Nonsmoker.   Past hx pertinent for following:  s/p NSTEMI 12/08 and was found to have a 100% chronic occluded mid-LCX artery and a 95% mid-RCA lesion (culprit vessel) and underwent successful PCI with a 4 x 23 mm Vision BMS.   Medical mgt was advised for his occluded LCX. He also was noted to have nonobstructive LAD disease at the time.  Pt seen 8/18 for recurrent exertional angina and had C next day with PCI performed of critical 95% prox LAD stenosis with Stent Resolute MELE x one,  LCX and patent RCA stent, normal EF.  Echo 9/20 normal LVEF, grade I DD.  Nuclear stress MPI 9/20 normal perfusion, normal EF.   Ecg 1/20/23 NSR, normal ECG.  Now here.  Nuclear stress test Jan 2024 personally reviewed: no significant ischemia noted, normal EF.  Echo Jan 2024 personally reviewed: normal LV function.  CAD is stable.  Angina well controlled on current med tx.  No active CP sxs.  No CHF sxs.  BP is controlled on current meds.  Lipids by PCP 10/23 LDL 49.  Contemplating knee surgery w Dr. Pepper.  Weight stable.         Past Medical History:   Diagnosis Date    Acute coronary syndrome     Coronary artery disease     Hyperlipidemia     Hypertension     Old MI (myocardial infarction) 4/17/2014    Status post insertion of drug-eluting stent into left anterior descending (LAD) artery 8/3/2018       Current Outpatient Medications:     aspirin (ECOTRIN) 81 MG EC tablet, Take 81 mg by mouth once daily., Disp: , Rfl:     atorvastatin (LIPITOR) 80 MG tablet, TAKE 1 TABLET EVERY EVENING, Disp: 90 tablet, Rfl: 3    isosorbide mononitrate (IMDUR) 30 MG 24 hr tablet, TAKE 1 TABLET EVERY DAY, Disp: 90 tablet, Rfl: 3    metoprolol tartrate (LOPRESSOR) 50 MG tablet, TAKE 1 TABLET TWICE  "DAILY, Disp: 180 tablet, Rfl: 3    niacin (NIASPAN EXTENDED-RELEASE) 750 MG CR tablet, Take 1 tablet (750 mg total) by mouth 2 (two) times daily., Disp: 180 tablet, Rfl: 3    nitroGLYCERIN (NITROSTAT) 0.4 MG SL tablet, Place 1 tablet (0.4 mg total) under the tongue every 5 (five) minutes as needed., Disp: 20 tablet, Rfl: 12    prasugreL (EFFIENT) 5 mg Tab, TAKE 1 TABLET EVERY DAY, Disp: 90 tablet, Rfl: 4    ramipriL (ALTACE) 5 MG capsule, TAKE 1 CAPSULE EVERY DAY, Disp: 90 capsule, Rfl: 3      Review of Systems   Constitutional: Negative.   HENT: Negative.     Eyes: Negative.    Cardiovascular: Negative.    Respiratory: Negative.     Endocrine: Negative.    Hematologic/Lymphatic: Negative.    Skin: Negative.    Musculoskeletal:  Positive for arthritis and joint pain.   Gastrointestinal: Negative.    Genitourinary: Negative.    Neurological: Negative.    Psychiatric/Behavioral: Negative.     Allergic/Immunologic: Negative.           /70 (BP Location: Right arm, Patient Position: Sitting, BP Method: Medium (Manual))   Pulse 67   Ht 5' 10" (1.778 m)   Wt 90.8 kg (200 lb 2.8 oz)   SpO2 96%   BMI 28.72 kg/m²       Wt Readings from Last 3 Encounters:   01/26/24 90.8 kg (200 lb 2.8 oz)   01/19/24 91.2 kg (201 lb)   07/26/23 91.3 kg (201 lb 4.5 oz)     Temp Readings from Last 3 Encounters:   08/03/18 96.7 °F (35.9 °C) (Oral)     BP Readings from Last 3 Encounters:   01/26/24 124/70   01/19/24 126/80   07/26/23 130/80     Pulse Readings from Last 3 Encounters:   01/26/24 67   07/26/23 65   01/20/23 68       Objective:    Physical Exam  Vitals and nursing note reviewed.   Constitutional:       Appearance: He is well-developed.   HENT:      Head: Normocephalic.   Neck:      Thyroid: No thyromegaly.      Vascular: Normal carotid pulses. No carotid bruit or JVD.   Cardiovascular:      Rate and Rhythm: Normal rate and regular rhythm.      Pulses:           Radial pulses are 2+ on the right side and 2+ on the left side. "      Heart sounds: S1 normal and S2 normal. Heart sounds not distant. No midsystolic click and no opening snap. No murmur heard.     No friction rub. No S3 or S4 sounds.   Pulmonary:      Effort: Pulmonary effort is normal.      Breath sounds: Normal breath sounds. No wheezing or rales.   Abdominal:      General: Bowel sounds are normal. There is no distension or abdominal bruit.      Palpations: Abdomen is soft. There is no mass.      Tenderness: There is no abdominal tenderness.   Musculoskeletal:      Cervical back: Neck supple.   Skin:     General: Skin is warm.   Neurological:      Mental Status: He is alert and oriented to person, place, and time.   Psychiatric:         Behavior: Behavior normal.       I have reviewed all pertinent labs and cardiac studies.          Results for orders placed during the hospital encounter of 01/19/24    Echo    Interpretation Summary    Left Ventricle: The left ventricle is normal in size. Normal wall thickness. Normal wall motion. There is normal systolic function with a visually estimated ejection fraction of 60 - 65%. There is normal diastolic function.    Right Ventricle: Normal right ventricular cavity size. Wall thickness is normal. Right ventricle wall motion  is normal. Systolic function is normal.    Tricuspid Valve: There is mild regurgitation.    IVC/SVC: Normal venous pressure at 3 mmHg.      Results for orders placed during the hospital encounter of 01/19/24    Nuclear Stress - Cardiology Interpreted    Interpretation Summary    Normal myocardial perfusion scan. There is no evidence of myocardial ischemia or infarction.    There is a  mild intensity fixed perfusion abnormality in the  wall of the left ventricle secondary to diaphragm attenuation.    The gated perfusion images showed an ejection fraction of 68% at rest. The gated perfusion images showed an ejection fraction of 73% post stress.    The ECG portion of the study is negative for ischemia.    The patient  reported no chest pain during the stress test.    There were no arrhythmias during stress.      Assessment:       1. Coronary artery disease of native artery of native heart with stable angina pectoris    2. Preop cardiovascular exam    3. AP (angina pectoris)    4. Old MI (myocardial infarction)    5. Overweight    6. Mixed hyperlipidemia    7. Status post insertion of drug-eluting stent into left anterior descending (LAD) artery    8. Primary hypertension    9. History of PTCA    10. CAD, multiple vessel         Plan:             Stable cardiovascular conditions at present time on current medical treatment.  Preop CV eval: pt may proceed w orthopedic surgery at low to moderate CV risk.  May hold asa and Effient x 5 - 7 days for surgery.  CAD: Stable. No significant ischemia noted on Jan 2024 nuclear stress test.  Continue OMT for CAD.  Continue DAPT as tolerated.  Reviewed all tests and above medical conditions with patient in detail and formulated treatment plan.  Continue optimal medical treatment for cardiovascular conditions.  Cardiac low salt diet advised.  Daily exercise encouraged, with the goal 30 +  minutes aerobic exercise as tolerated.  Maintaining healthy weight and weight loss goals (if needed) were discussed in clinic.  HTN: Need for BP control and HTN goals (if needed) were discussed and tx plan formulated.  Goal < 130/80.  Continue current HTN meds.  Home BP monitoring.  Lipids: Importance of optimal lipid control were discussed in detail as well as possible pharmacologic and lifestyle changes that may be needed.  Continue statin tx.     F/u in 6 months.    I have reviewed all pertinent labs and cardiac studies independently. Plans and recommendations have been formulated under my direct supervision. All questions answered and patient voiced understanding.

## 2024-03-07 RX ORDER — ISOSORBIDE MONONITRATE 30 MG/1
TABLET, EXTENDED RELEASE ORAL
Qty: 90 TABLET | Refills: 3 | Status: SHIPPED | OUTPATIENT
Start: 2024-03-07

## 2024-07-19 ENCOUNTER — OFFICE VISIT (OUTPATIENT)
Facility: CLINIC | Age: 74
End: 2024-07-19
Payer: MEDICARE

## 2024-07-19 VITALS
SYSTOLIC BLOOD PRESSURE: 131 MMHG | WEIGHT: 194.88 LBS | BODY MASS INDEX: 27.96 KG/M2 | HEART RATE: 79 BPM | RESPIRATION RATE: 16 BRPM | DIASTOLIC BLOOD PRESSURE: 78 MMHG

## 2024-07-19 DIAGNOSIS — R35.1 NOCTURIA: ICD-10-CM

## 2024-07-19 DIAGNOSIS — R97.20 ELEVATED PSA: Primary | ICD-10-CM

## 2024-07-19 DIAGNOSIS — Z80.42 FAMILY HISTORY OF PROSTATE CANCER: ICD-10-CM

## 2024-07-19 DIAGNOSIS — N40.1 HYPERPLASIA OF PROSTATE WITH LOWER URINARY TRACT SYMPTOMS (LUTS): ICD-10-CM

## 2024-07-19 LAB
BILIRUB UR QL STRIP: POSITIVE
GLUCOSE UR QL STRIP: NEGATIVE
KETONES UR QL STRIP: POSITIVE
LEUKOCYTE ESTERASE UR QL STRIP: NEGATIVE
PH, POC UA: 5.5
POC BLOOD, URINE: POSITIVE
POC NITRATES, URINE: NEGATIVE
PROT UR QL STRIP: NEGATIVE
SP GR UR STRIP: >=1.03 (ref 1–1.03)
UROBILINOGEN UR STRIP-ACNC: 0.2 (ref 0.3–2.2)

## 2024-07-19 PROCEDURE — 81003 URINALYSIS AUTO W/O SCOPE: CPT | Mod: QW,S$GLB,, | Performed by: UROLOGY

## 2024-07-19 PROCEDURE — 4010F ACE/ARB THERAPY RXD/TAKEN: CPT | Mod: CPTII,S$GLB,, | Performed by: UROLOGY

## 2024-07-19 PROCEDURE — 99999 PR PBB SHADOW E&M-EST. PATIENT-LVL III: CPT | Mod: PBBFAC,,, | Performed by: UROLOGY

## 2024-07-19 PROCEDURE — 3008F BODY MASS INDEX DOCD: CPT | Mod: CPTII,S$GLB,, | Performed by: UROLOGY

## 2024-07-19 PROCEDURE — 3078F DIAST BP <80 MM HG: CPT | Mod: CPTII,S$GLB,, | Performed by: UROLOGY

## 2024-07-19 PROCEDURE — 1126F AMNT PAIN NOTED NONE PRSNT: CPT | Mod: CPTII,S$GLB,, | Performed by: UROLOGY

## 2024-07-19 PROCEDURE — 99214 OFFICE O/P EST MOD 30 MIN: CPT | Mod: S$GLB,,, | Performed by: UROLOGY

## 2024-07-19 PROCEDURE — 1159F MED LIST DOCD IN RCRD: CPT | Mod: CPTII,S$GLB,, | Performed by: UROLOGY

## 2024-07-19 PROCEDURE — 3288F FALL RISK ASSESSMENT DOCD: CPT | Mod: CPTII,S$GLB,, | Performed by: UROLOGY

## 2024-07-19 PROCEDURE — 3075F SYST BP GE 130 - 139MM HG: CPT | Mod: CPTII,S$GLB,, | Performed by: UROLOGY

## 2024-07-19 PROCEDURE — 1101F PT FALLS ASSESS-DOCD LE1/YR: CPT | Mod: CPTII,S$GLB,, | Performed by: UROLOGY

## 2024-07-19 NOTE — PROGRESS NOTES
Subjective:       Patient ID: Harry Phan is a 73 y.o. male.    Chief Complaint: Follow-up      History of Present Illness:     Mr Phan has an elevated PSA that we have been monitoring.  He had a prostate MRI in 2023 that did not show prostate cancer.  He had a prostate MRI a few years prior to 2023 that did not show prostate cancer.  He has BPH with mild and significantly improved LUTS on tamsulosin.  Urinary flow is better on tamsulosin.  He says he empties his bladder better on tamsulosin.  Nocturia has improved from X 3-4 to X 1 on average on tamsulosin.  He has a family history of prostate cancer in his uncle.         Past Medical History:   Diagnosis Date    Acute coronary syndrome     Coronary artery disease     Hyperlipidemia     Hypertension     Old MI (myocardial infarction) 4/17/2014    Status post insertion of drug-eluting stent into left anterior descending (LAD) artery 8/3/2018     Family History   Problem Relation Name Age of Onset    Heart attack Father  68     Social History     Socioeconomic History    Marital status:    Tobacco Use    Smoking status: Never    Smokeless tobacco: Never   Substance and Sexual Activity    Alcohol use: Yes    Drug use: No     Outpatient Encounter Medications as of 7/19/2024   Medication Sig Dispense Refill    aspirin (ECOTRIN) 81 MG EC tablet Take 81 mg by mouth once daily.      atorvastatin (LIPITOR) 80 MG tablet TAKE 1 TABLET EVERY EVENING 90 tablet 3    isosorbide mononitrate (IMDUR) 30 MG 24 hr tablet TAKE 1 TABLET EVERY DAY 90 tablet 3    metoprolol tartrate (LOPRESSOR) 50 MG tablet TAKE 1 TABLET TWICE DAILY 180 tablet 3    niacin (NIASPAN EXTENDED-RELEASE) 750 MG CR tablet Take 1 tablet (750 mg total) by mouth 2 (two) times daily. 180 tablet 3    prasugreL (EFFIENT) 5 mg Tab TAKE 1 TABLET EVERY DAY 90 tablet 4    ramipriL (ALTACE) 5 MG capsule TAKE 1 CAPSULE EVERY DAY 90 capsule 3    nitroGLYCERIN (NITROSTAT) 0.4 MG SL tablet Place 1 tablet (0.4 mg  total) under the tongue every 5 (five) minutes as needed. 20 tablet 12     No facility-administered encounter medications on file as of 7/19/2024.        Review of Systems   Constitutional:  Negative for chills and fever.   Respiratory:  Negative for shortness of breath.    Cardiovascular:  Negative for chest pain.   Gastrointestinal:  Negative for nausea and vomiting.   Genitourinary:  Negative for hematuria.   Musculoskeletal:  Negative for back pain.   Skin:  Negative for rash.   Neurological:  Negative for dizziness.   Psychiatric/Behavioral:  Negative for agitation.        Objective:     /78 (BP Location: Right arm, Patient Position: Sitting)   Pulse 79   Resp 16   Wt 88.4 kg (194 lb 14.2 oz)   BMI 27.96 kg/m²     Physical Exam  Constitutional:       Appearance: Normal appearance.   Pulmonary:      Effort: Pulmonary effort is normal.   Abdominal:      Palpations: Abdomen is soft.   Genitourinary:     Comments: Prostate exam was deferred  Neurological:      Mental Status: He is alert and oriented to person, place, and time.   Psychiatric:         Mood and Affect: Mood normal.         Office Visit on 07/19/2024   Component Date Value Ref Range Status    POC Blood, Urine 07/19/2024 Positive (A)  Negative Final    POC Bilirubin, Urine 07/19/2024 Positive (A)  Negative Final    POC Urobilinogen, Urine 07/19/2024 0.2 (A)  0.3 - 2.2 Final    POC Ketones, Urine 07/19/2024 Positive (A)  Negative Final    POC Protein, Urine 07/19/2024 Negative  Negative Final    POC Nitrates, Urine 07/19/2024 Negative  Negative Final    POC Glucose, Urine 07/19/2024 Negative  Negative Final    pH, UA 07/19/2024 5.5   Final    POC Specific Gravity, Urine 07/19/2024 >=1.030  1.003 - 1.029 Final    POC Leukocytes, Urine 07/19/2024 Negative  Negative Final        No results found for this or any previous visit (from the past 8760 hour(s)).     Assessment:       1. Elevated PSA    2. Hyperplasia of prostate with lower urinary tract  symptoms (LUTS)    3. Nocturia    4. Family history of prostate cancer      Plan:     Orders Placed This Encounter    PSA, Total and Free    POCT Urinalysis, Dipstick, Automated, W/O Scope          1-5-23  PSA 8.47    9-26-22  5.53    4-16-24  Cr 0.87    I reviewed the above lab results.         Assessment:  - Elevated PSA.  He had a prostate MRI in 2023 that did not show prostate cancer.  He had a prostate MRI a few years prior to 2023 that did not show prostate cancer.  - BPH with mild and significantly improved LUTS on tamsulosin.  - Nocturia with significant improved with tamsulosin.  - Family history of prostate cancer in his uncle.    Plan:  - PSA on 7-22-24.  He says he has been riding on a tractor this week.  - Continue tamsulosin 0.4 mg 1 PO qhs.  He says he does not need a new prescription at this time.  He says his PCP refilled his tamsulosin in April 2024.  - I discussed dietary modifications with him today and I recommended he drink mostly water during the day.  - I recommended he limit his fluid intake at night to small amounts of water and to void just prior to bedtime.   - RTC in 6 months with a PVR on arrival.

## 2024-07-22 ENCOUNTER — LAB VISIT (OUTPATIENT)
Dept: LAB | Facility: HOSPITAL | Age: 74
End: 2024-07-22
Attending: UROLOGY
Payer: MEDICARE

## 2024-07-22 DIAGNOSIS — R97.20 ELEVATED PSA: ICD-10-CM

## 2024-07-22 LAB
PROSTATE SPECIFIC ANTIGEN, TOTAL: 5.6 NG/ML (ref 0–4)
PSA FREE MFR SERPL: 20.36 %
PSA FREE SERPL-MCNC: 1.14 NG/ML (ref 0–1.5)

## 2024-07-22 PROCEDURE — 36415 COLL VENOUS BLD VENIPUNCTURE: CPT | Mod: PO | Performed by: UROLOGY

## 2024-07-22 PROCEDURE — 84153 ASSAY OF PSA TOTAL: CPT | Performed by: UROLOGY

## 2024-07-22 PROCEDURE — 84154 ASSAY OF PSA FREE: CPT | Performed by: UROLOGY

## 2024-07-24 ENCOUNTER — TELEPHONE (OUTPATIENT)
Dept: UROLOGY | Facility: CLINIC | Age: 74
End: 2024-07-24
Payer: MEDICARE

## 2024-07-24 DIAGNOSIS — R97.20 ELEVATED PSA: Primary | ICD-10-CM

## 2024-07-24 DIAGNOSIS — N40.1 HYPERPLASIA OF PROSTATE WITH LOWER URINARY TRACT SYMPTOMS (LUTS): ICD-10-CM

## 2024-07-24 NOTE — TELEPHONE ENCOUNTER
Called pt to see if he received Dr. Diez's voice message regarding his psa; he stated that he did get the message and would prefer to just repeat the psa; Dr. Diez notified to place order.

## 2024-07-24 NOTE — TELEPHONE ENCOUNTER
Left  for pt informing him that Dr. Diez wanted to have him come to clinic in 3 months with labs prior.  Appts scheduled.

## 2024-08-10 DIAGNOSIS — I10 PRIMARY HYPERTENSION: ICD-10-CM

## 2024-08-12 RX ORDER — RAMIPRIL 5 MG/1
CAPSULE ORAL
Qty: 90 CAPSULE | Refills: 3 | Status: SHIPPED | OUTPATIENT
Start: 2024-08-12

## 2024-09-04 DIAGNOSIS — I25.10 CORONARY ARTERY DISEASE: ICD-10-CM

## 2024-09-04 DIAGNOSIS — Z98.61 HISTORY OF PTCA: ICD-10-CM

## 2024-09-04 DIAGNOSIS — E78.2 MIXED HYPERLIPIDEMIA: ICD-10-CM

## 2024-09-04 DIAGNOSIS — I25.118 CORONARY ARTERY DISEASE OF NATIVE ARTERY OF NATIVE HEART WITH STABLE ANGINA PECTORIS: ICD-10-CM

## 2024-09-04 RX ORDER — ATORVASTATIN CALCIUM 80 MG/1
TABLET, FILM COATED ORAL
Qty: 90 TABLET | Refills: 3 | Status: SHIPPED | OUTPATIENT
Start: 2024-09-04

## 2024-09-04 RX ORDER — PRASUGREL 5 MG/1
TABLET, FILM COATED ORAL
Qty: 90 TABLET | Refills: 3 | Status: SHIPPED | OUTPATIENT
Start: 2024-09-04

## 2024-09-06 ENCOUNTER — OFFICE VISIT (OUTPATIENT)
Dept: CARDIOLOGY | Facility: CLINIC | Age: 74
End: 2024-09-06
Payer: MEDICARE

## 2024-09-06 VITALS
HEART RATE: 79 BPM | HEIGHT: 70 IN | DIASTOLIC BLOOD PRESSURE: 78 MMHG | SYSTOLIC BLOOD PRESSURE: 138 MMHG | OXYGEN SATURATION: 98 % | BODY MASS INDEX: 28.25 KG/M2 | RESPIRATION RATE: 16 BRPM | WEIGHT: 197.31 LBS

## 2024-09-06 DIAGNOSIS — I25.118 CORONARY ARTERY DISEASE OF NATIVE ARTERY OF NATIVE HEART WITH STABLE ANGINA PECTORIS: Primary | ICD-10-CM

## 2024-09-06 DIAGNOSIS — Z98.61 HISTORY OF PTCA: ICD-10-CM

## 2024-09-06 DIAGNOSIS — Z95.5 STATUS POST INSERTION OF DRUG-ELUTING STENT INTO LEFT ANTERIOR DESCENDING (LAD) ARTERY: ICD-10-CM

## 2024-09-06 DIAGNOSIS — I25.10 CAD, MULTIPLE VESSEL: ICD-10-CM

## 2024-09-06 DIAGNOSIS — I10 PRIMARY HYPERTENSION: ICD-10-CM

## 2024-09-06 DIAGNOSIS — E78.2 MIXED HYPERLIPIDEMIA: ICD-10-CM

## 2024-09-06 DIAGNOSIS — E66.3 OVERWEIGHT: ICD-10-CM

## 2024-09-06 DIAGNOSIS — I20.9 AP (ANGINA PECTORIS): ICD-10-CM

## 2024-09-06 DIAGNOSIS — I25.2 OLD MI (MYOCARDIAL INFARCTION): ICD-10-CM

## 2024-09-06 PROCEDURE — 99999 PR PBB SHADOW E&M-EST. PATIENT-LVL III: CPT | Mod: PBBFAC,,, | Performed by: INTERNAL MEDICINE

## 2024-09-06 RX ORDER — TAMSULOSIN HYDROCHLORIDE 0.4 MG/1
0.4 CAPSULE ORAL DAILY
COMMUNITY

## 2024-09-06 NOTE — PROGRESS NOTES
Subjective:    Patient ID:  Harry Phan is a 73 y.o. male who presents for evaluation of Hypertension, Hyperlipidemia, and Coronary Artery Disease          HPI Pt presents for eval.   His current medical conditions include CAD, HTN, dyslipidemia.   Nonsmoker.   Past hx pertinent for following:  s/p NSTEMI 12/08 and was found to have a 100% chronic occluded mid-LCX artery and a 95% mid-RCA lesion (culprit vessel) and underwent successful PCI with a 4 x 23 mm Vision BMS.   Medical mgt was advised for his occluded LCX. He also was noted to have nonobstructive LAD disease at the time.  Pt seen 8/18 for recurrent exertional angina and had C next day with PCI performed of critical 95% prox LAD stenosis with Stent Resolute MELE x one,  LCX and patent RCA stent, normal EF.  Echo 9/20 normal LVEF, grade I DD.  Nuclear stress MPI 9/20 normal perfusion, normal EF.   Ecg 1/20/23 NSR, normal ECG.  Nuclear stress test Jan 2024 personally reviewed: no significant ischemia noted, normal EF.  Echo Jan 2024 personally reviewed: normal LV function.  Now here for 6 month f/u appt.  No acute issues noted.  BP up some, ran out of Altace but has refill now.  CAD is stable.  No active anginal sxs.  Denies CP sxs.  No CHF sxs.  Weight stable.  Compliant w meds.  Lipids well controlled, on statin tx.            Past Medical History:   Diagnosis Date    Acute coronary syndrome     Coronary artery disease     Hyperlipidemia     Hypertension     Old MI (myocardial infarction) 4/17/2014    Status post insertion of drug-eluting stent into left anterior descending (LAD) artery 8/3/2018       Current Outpatient Medications:     tamsulosin (FLOMAX) 0.4 mg Cap, Take 0.4 mg by mouth once daily., Disp: , Rfl:     aspirin (ECOTRIN) 81 MG EC tablet, Take 81 mg by mouth once daily., Disp: , Rfl:     atorvastatin (LIPITOR) 80 MG tablet, TAKE 1 TABLET EVERY EVENING, Disp: 90 tablet, Rfl: 3    isosorbide mononitrate (IMDUR) 30 MG 24 hr tablet, TAKE  "1 TABLET EVERY DAY, Disp: 90 tablet, Rfl: 3    metoprolol tartrate (LOPRESSOR) 50 MG tablet, TAKE 1 TABLET TWICE DAILY, Disp: 180 tablet, Rfl: 3    niacin (NIASPAN EXTENDED-RELEASE) 750 MG CR tablet, Take 1 tablet (750 mg total) by mouth 2 (two) times daily., Disp: 180 tablet, Rfl: 3    nitroGLYCERIN (NITROSTAT) 0.4 MG SL tablet, Place 1 tablet (0.4 mg total) under the tongue every 5 (five) minutes as needed., Disp: 20 tablet, Rfl: 12    prasugreL (EFFIENT) 5 mg Tab, TAKE 1 TABLET EVERY DAY, Disp: 90 tablet, Rfl: 3    ramipriL (ALTACE) 5 MG capsule, TAKE 1 CAPSULE EVERY DAY, Disp: 90 capsule, Rfl: 3      Review of Systems   Constitutional: Negative.   HENT: Negative.     Eyes: Negative.    Cardiovascular: Negative.    Respiratory: Negative.     Endocrine: Negative.    Hematologic/Lymphatic: Negative.    Skin: Negative.    Musculoskeletal:  Positive for arthritis and joint pain.   Gastrointestinal: Negative.    Genitourinary: Negative.    Neurological: Negative.    Psychiatric/Behavioral: Negative.     Allergic/Immunologic: Negative.           /78 (BP Location: Left arm, Patient Position: Sitting, BP Method: Medium (Manual))   Pulse 79   Resp 16   Ht 5' 10" (1.778 m)   Wt 89.5 kg (197 lb 5 oz)   SpO2 98%   BMI 28.31 kg/m²       Wt Readings from Last 3 Encounters:   09/06/24 89.5 kg (197 lb 5 oz)   07/19/24 88.4 kg (194 lb 14.2 oz)   01/26/24 90.8 kg (200 lb 2.8 oz)     Temp Readings from Last 3 Encounters:   08/03/18 96.7 °F (35.9 °C) (Oral)     BP Readings from Last 3 Encounters:   09/06/24 138/78   07/19/24 131/78   01/26/24 124/70     Pulse Readings from Last 3 Encounters:   09/06/24 79   07/19/24 79   01/26/24 67       Objective:    Physical Exam  Vitals and nursing note reviewed.   Constitutional:       Appearance: He is well-developed.   HENT:      Head: Normocephalic.   Neck:      Thyroid: No thyromegaly.      Vascular: Normal carotid pulses. No carotid bruit or JVD.   Cardiovascular:      Rate and " Rhythm: Normal rate and regular rhythm.      Pulses:           Radial pulses are 2+ on the right side and 2+ on the left side.      Heart sounds: S1 normal and S2 normal. Heart sounds not distant. No midsystolic click and no opening snap. No murmur heard.     No friction rub. No S3 or S4 sounds.   Pulmonary:      Effort: Pulmonary effort is normal.      Breath sounds: Normal breath sounds. No wheezing or rales.   Abdominal:      General: Bowel sounds are normal. There is no distension or abdominal bruit.      Palpations: Abdomen is soft. There is no mass.      Tenderness: There is no abdominal tenderness.   Musculoskeletal:      Cervical back: Neck supple.   Skin:     General: Skin is warm.   Neurological:      Mental Status: He is alert and oriented to person, place, and time.   Psychiatric:         Behavior: Behavior normal.       I have reviewed all pertinent labs and cardiac studies.      4 mo ago     CHOLESTEROL 0 - 200 mg/dL 104   LDL CHOLESTEROL mg/dL 51   Comment:    Optimal:                 < 100 mg/dL     Near Optimal:    100 - 129 mg/dL     Borderline High: 130 - 159 mg/dL     High:                 160 - 189 mg/dL     Very High:                >189 mg/dL   HDL CHOLESTEROL 40 - 59 mg/dL 46   TRIGLYCERIDES 0 - 149 mg/dL 50   Comment:    Normal                    <150 mg/dL     Borderline High  150 - 199 mg/dL     High                  200 - 499 mg/dL     Very High                > 500 mg/dL   NON-HDLC mg/dL 58         Results for orders placed during the hospital encounter of 01/19/24    Echo    Interpretation Summary    Left Ventricle: The left ventricle is normal in size. Normal wall thickness. Normal wall motion. There is normal systolic function with a visually estimated ejection fraction of 60 - 65%. There is normal diastolic function.    Right Ventricle: Normal right ventricular cavity size. Wall thickness is normal. Right ventricle wall motion  is normal. Systolic function is normal.    Tricuspid  Valve: There is mild regurgitation.    IVC/SVC: Normal venous pressure at 3 mmHg.      Results for orders placed during the hospital encounter of 01/19/24    Nuclear Stress - Cardiology Interpreted    Interpretation Summary    Normal myocardial perfusion scan. There is no evidence of myocardial ischemia or infarction.    There is a  mild intensity fixed perfusion abnormality in the  wall of the left ventricle secondary to diaphragm attenuation.    The gated perfusion images showed an ejection fraction of 68% at rest. The gated perfusion images showed an ejection fraction of 73% post stress.    The ECG portion of the study is negative for ischemia.    The patient reported no chest pain during the stress test.    There were no arrhythmias during stress.      Assessment:       1. Coronary artery disease of native artery of native heart with stable angina pectoris    2. History of PTCA    3. Primary hypertension    4. AP (angina pectoris)    5. CAD, multiple vessel    6. Status post insertion of drug-eluting stent into left anterior descending (LAD) artery    7. Overweight    8. Old MI (myocardial infarction)    9. Mixed hyperlipidemia         Plan:             Stable cardiovascular conditions at present time on current medical treatment.  CAD: Stable. No significant ischemia noted on Jan 2024 nuclear stress test.  Continue OMT for CAD.  Continue DAPT as tolerated.  Reviewed all tests and above medical conditions with patient in detail and formulated treatment plan.  Continue optimal medical treatment for cardiovascular conditions.  Cardiac low salt diet advised.  Daily exercise encouraged, with the goal 30 +  minutes aerobic exercise as tolerated.  Maintaining healthy weight and weight loss goals (if needed) were discussed in clinic.  HTN: Need for BP control and HTN goals (if needed) were discussed and tx plan formulated.  Goal < 130/80.  Continue current HTN meds.  Home BP monitoring.  Lipids: Importance of optimal  lipid control were discussed in detail as well as possible pharmacologic and lifestyle changes that may be needed.  Continue statin tx.     F/u in 6 months.    I have reviewed all pertinent labs and cardiac studies independently. Plans and recommendations have been formulated under my direct supervision. All questions answered and patient voiced understanding.

## 2024-10-21 ENCOUNTER — LAB VISIT (OUTPATIENT)
Dept: LAB | Facility: HOSPITAL | Age: 74
End: 2024-10-21
Attending: UROLOGY
Payer: MEDICARE

## 2024-10-21 DIAGNOSIS — R97.20 ELEVATED PSA: ICD-10-CM

## 2024-10-21 DIAGNOSIS — N40.1 HYPERPLASIA OF PROSTATE WITH LOWER URINARY TRACT SYMPTOMS (LUTS): ICD-10-CM

## 2024-10-21 LAB
CREAT SERPL-MCNC: 0.8 MG/DL (ref 0.5–1.4)
EST. GFR  (NO RACE VARIABLE): >60 ML/MIN/1.73 M^2
PROSTATE SPECIFIC ANTIGEN, TOTAL: 3.7 NG/ML (ref 0–4)
PSA FREE MFR SERPL: 20.54 %
PSA FREE SERPL-MCNC: 0.76 NG/ML (ref 0–1.5)

## 2024-10-21 PROCEDURE — 82565 ASSAY OF CREATININE: CPT | Performed by: UROLOGY

## 2024-10-21 PROCEDURE — 84154 ASSAY OF PSA FREE: CPT | Performed by: UROLOGY

## 2024-10-21 PROCEDURE — 36415 COLL VENOUS BLD VENIPUNCTURE: CPT | Mod: PO | Performed by: UROLOGY

## 2024-10-24 DIAGNOSIS — I10 ESSENTIAL HYPERTENSION: ICD-10-CM

## 2024-10-24 DIAGNOSIS — I25.10 CORONARY ARTERY DISEASE: ICD-10-CM

## 2024-10-24 RX ORDER — METOPROLOL TARTRATE 50 MG/1
TABLET ORAL
Qty: 180 TABLET | Refills: 3 | Status: SHIPPED | OUTPATIENT
Start: 2024-10-24

## 2024-10-25 ENCOUNTER — OFFICE VISIT (OUTPATIENT)
Facility: CLINIC | Age: 74
End: 2024-10-25
Payer: MEDICARE

## 2024-10-25 DIAGNOSIS — N40.0 BPH WITHOUT URINARY OBSTRUCTION: ICD-10-CM

## 2024-10-25 DIAGNOSIS — Z80.42 FAMILY HISTORY OF PROSTATE CANCER: ICD-10-CM

## 2024-10-25 DIAGNOSIS — R97.20 ELEVATED PSA: ICD-10-CM

## 2024-10-25 DIAGNOSIS — R31.29 OTHER MICROSCOPIC HEMATURIA: Primary | ICD-10-CM

## 2024-10-25 PROCEDURE — 99999 PR PBB SHADOW E&M-EST. PATIENT-LVL III: CPT | Mod: PBBFAC,,, | Performed by: UROLOGY

## 2024-10-25 NOTE — PROGRESS NOTES
Subjective:       Patient ID: Harry Phan is a 73 y.o. male.    Chief Complaint: Follow-up      History of Present Illness:     Mr Phan has persistent trace amount of microscopic hematuria.  Trace amount of MSH today on 10-25-24.   No gross hematuria.  He is not a smoker and he has no smoking history.  He takes prasugrel and aspirin 81 mg.    He has an elevated PSA that has normalized with observation.  He underwent a prostate MRI on 3-16-23 that showed no focal suspicious lesions in the prostate.  BPH.  PIRADS 2.  Prostate volume is 61 cc.  He has BPH without obstructive LUTS on tamsulosin.  Normal urinary flow.  Nocturia X 1 which is a significant improvement with tamsulosin.  HE has a family history of prostate cancer in his uncle.         Past Medical History:   Diagnosis Date    Acute coronary syndrome     Coronary artery disease     Hyperlipidemia     Hypertension     Old MI (myocardial infarction) 4/17/2014    Status post insertion of drug-eluting stent into left anterior descending (LAD) artery 8/3/2018     Family History   Problem Relation Name Age of Onset    Heart attack Father  68     Social History     Socioeconomic History    Marital status:    Tobacco Use    Smoking status: Never    Smokeless tobacco: Never   Substance and Sexual Activity    Alcohol use: Yes    Drug use: No     Social Drivers of Health     Financial Resource Strain: Low Risk  (8/30/2024)    Overall Financial Resource Strain (CARDIA)     Difficulty of Paying Living Expenses: Not hard at all   Food Insecurity: No Food Insecurity (8/30/2024)    Hunger Vital Sign     Worried About Running Out of Food in the Last Year: Never true     Ran Out of Food in the Last Year: Never true   Physical Activity: Insufficiently Active (8/30/2024)    Exercise Vital Sign     Days of Exercise per Week: 4 days     Minutes of Exercise per Session: 30 min   Stress: No Stress Concern Present (8/30/2024)    [employer]  - Occupational Stress Questionnaire     Feeling of Stress : Not at all   Housing Stability: Unknown (8/30/2024)    Housing Stability Vital Sign     Unable to Pay for Housing in the Last Year: No     Outpatient Encounter Medications as of 10/25/2024   Medication Sig Dispense Refill    aspirin (ECOTRIN) 81 MG EC tablet Take 81 mg by mouth once daily.      atorvastatin (LIPITOR) 80 MG tablet TAKE 1 TABLET EVERY EVENING 90 tablet 3    isosorbide mononitrate (IMDUR) 30 MG 24 hr tablet TAKE 1 TABLET EVERY DAY 90 tablet 3    metoprolol tartrate (LOPRESSOR) 50 MG tablet TAKE 1 TABLET TWICE DAILY 180 tablet 3    niacin (NIASPAN EXTENDED-RELEASE) 750 MG CR tablet Take 1 tablet (750 mg total) by mouth 2 (two) times daily. 180 tablet 3    prasugreL (EFFIENT) 5 mg Tab TAKE 1 TABLET EVERY DAY 90 tablet 3    ramipriL (ALTACE) 5 MG capsule TAKE 1 CAPSULE EVERY DAY 90 capsule 3    tamsulosin (FLOMAX) 0.4 mg Cap Take 0.4 mg by mouth once daily.      nitroGLYCERIN (NITROSTAT) 0.4 MG SL tablet Place 1 tablet (0.4 mg total) under the tongue every 5 (five) minutes as needed. 20 tablet 12    [DISCONTINUED] metoprolol tartrate (LOPRESSOR) 50 MG tablet TAKE 1 TABLET TWICE DAILY 180 tablet 3     No facility-administered encounter medications on file as of 10/25/2024.        Review of Systems   Constitutional:  Negative for chills and fever.   Respiratory:  Negative for shortness of breath.    Cardiovascular:  Negative for chest pain.   Gastrointestinal:  Negative for nausea and vomiting.   Genitourinary:  Negative for difficulty urinating.   Musculoskeletal:  Negative for back pain.   Skin:  Negative for rash.   Neurological:  Negative for dizziness.   Psychiatric/Behavioral:  Negative for agitation.        Objective:     There were no vitals taken for this visit.    Physical Exam  Constitutional:       Appearance: Normal appearance.   Pulmonary:      Effort: Pulmonary effort is normal.   Abdominal:      Palpations: Abdomen is soft.    Neurological:      Mental Status: He is alert and oriented to person, place, and time.   Psychiatric:         Mood and Affect: Mood normal.         No visits with results within 1 Day(s) from this visit.   Latest known visit with results is:   Lab Visit on 10/21/2024   Component Date Value Ref Range Status    PSA Total 10/21/2024 3.7  0.00 - 4.00 ng/mL Final    Comment: The testing method is a chemiluminescent microparticle immunoassay   manufactured by [employer] and performed on the [employer]   or   [employer] system. Values obtained with different assay manufacturers   for   methods may be different and cannot be used interchangeably.  PSA Expected levels:  Hormonal Therapy: <0.05 ng/ml  Prostatectomy: <0.01 ng/ml  Radiation Therapy: <1.00 ng/ml      PSA, Free 10/21/2024 0.76  0.00 - 1.50 ng/mL Final    Comment: The testing method is a chemiluminescent microparticle immunoassay   manufactured by [employer] and performed on the [employer]   or   [employer] system. Values obtained with different assay manufacturers   for   methods may be different and cannot be used interchangeably.      PSA, Free % 10/21/2024 20.54  Not established % Final    Creatinine 10/21/2024 0.8  0.5 - 1.4 mg/dL Final    eGFR 10/21/2024 >60.0  >60 mL/min/1.73 m^2 Final        No results found for this or any previous visit (from the past 8760 hours).     Assessment:       1. Other microscopic hematuria    2. Elevated PSA    3. BPH without urinary obstruction    4. Family history of prostate cancer      Plan:     Orders Placed This Encounter    PSA, Total and Free        3-16-23  Prostate MRI.  [facility].  See report.  No focal suspicious lesions in the prostate.  BPH.  PIRADS 2.  Prostate volume is 61 cc.  There is no lymphadenopathy.    8-27-18  Prostate MRI.  [facility].  See report.  There is no focal suspicious lesion.  PIRADS 2.  Prostate size is 45.19 cc.    I reviewed the above imaging results.         10-21-24  PSA  3.7    7-22-24  PSA 5.6.  Free % PSA 20.36.    8-3-23  PSA 4.35.  Free % PSA 17.2.    1-5-23  PSA 8.47     9-26-22  5.53    10-21-24  Cr 0.8.  GFR >60.     I reviewed the above lab results.            Assessment:  - Persistent trace amount of microscopic hematuria.  Trace amount of MSH today on 10-25-24.  He is not a smoker and he has no smoking history.  He takes prasugrel and aspirin 81 mg.  - Elevated PSA that has normalized with observation.  - Prostate MRI on 3-16-23 showed no focal suspicious lesions in the prostate.  BPH.  PIRADS 2.  Prostate volume is 61 cc.   - BPH without obstructive LUTS on tamsulosin.  - Nocturia with significant improvement with tamsulosin.  - Family history of prostate cancer in his uncle.  - CAD.  History of a MI in 2008.  He has 2 coronary stents.  He takes prasugrel and aspirin 81 mg.     Plan:  - The mutual decision was made to proceed with observation of his trace amount of microscopic hematuria with a repeat urinalysis in 6 months.  - Continue tamsulosin 0.4 mg 1 PO qhs that was started on 3-21-23.    - I discussed dietary modifications with him today and I recommended he drink mostly water during the day.  - I recommended he limit his fluid intake at night to small amounts of water and to void just prior to bedtime.   - PSA in 6 months a few days prior to a 6 month office visit.

## 2024-12-18 DIAGNOSIS — I10 PRIMARY HYPERTENSION: ICD-10-CM

## 2024-12-18 DIAGNOSIS — E78.2 MIXED HYPERLIPIDEMIA: ICD-10-CM

## 2024-12-18 DIAGNOSIS — I25.118 CORONARY ARTERY DISEASE OF NATIVE ARTERY OF NATIVE HEART WITH STABLE ANGINA PECTORIS: Primary | ICD-10-CM

## 2024-12-18 DIAGNOSIS — I20.9 AP (ANGINA PECTORIS): ICD-10-CM

## 2024-12-18 DIAGNOSIS — I25.2 OLD MI (MYOCARDIAL INFARCTION): ICD-10-CM

## 2025-01-30 ENCOUNTER — TELEPHONE (OUTPATIENT)
Dept: CARDIOLOGY | Facility: CLINIC | Age: 75
End: 2025-01-30
Payer: MEDICARE

## 2025-01-30 NOTE — TELEPHONE ENCOUNTER
Please fax clearance.  May proceed at moderate CV risk.  Hold asa and Effient x 5 - 7 days for surgery.    Lilibeth Lnace, LPN sent to Hernandez Sen MD  Please advise    [facility] are requesting ( Please addend most recent office visits in EPIC if cleared for Total right knee replacement on 02/10/25, Please notify PT if apt is needed.    I placed recent EKG, labs, and chest Xray from [facility] in [facility]      Fax: [phone]  Phone: [phone]

## 2025-02-04 ENCOUNTER — TELEPHONE (OUTPATIENT)
Dept: CARDIOLOGY | Facility: CLINIC | Age: 75
End: 2025-02-04
Payer: MEDICARE

## 2025-02-11 ENCOUNTER — TELEPHONE (OUTPATIENT)
Dept: UROLOGY | Facility: CLINIC | Age: 75
End: 2025-02-11
Payer: MEDICARE

## 2025-02-11 ENCOUNTER — TELEPHONE (OUTPATIENT)
Dept: CARDIOLOGY | Facility: CLINIC | Age: 75
End: 2025-02-11
Payer: MEDICARE

## 2025-02-11 NOTE — TELEPHONE ENCOUNTER
----- Message from Delmi sent at 2/11/2025  1:56 PM CST -----  Name of Who is Calling:SUSAN HATHAWAY [[id number]] ([facility])        What is the request in detail:Pt had a knee replacement and blood pressure was 100 over 50. [facility] was calling to see if he needs to hold any meds        Can the clinic reply by [facility]:Call        What Number to Call Back if not in [facility]:Telephone Information:  [employer]          [phone]      [facility] [phone]

## 2025-02-11 NOTE — TELEPHONE ENCOUNTER
Hold Ramipril until BP improved.    Thanks    Lilibeth Lance LPN sent to Hernandez Sen MD  Please advise    Pt had a knee replacement and blood pressure was 100 over 50. [facility] was calling to see if he needs to hold any meds    Thank you  Zaynab Rodriguez LPN

## 2025-02-18 ENCOUNTER — TELEPHONE (OUTPATIENT)
Dept: CARDIOLOGY | Facility: CLINIC | Age: 75
End: 2025-02-18
Payer: MEDICARE

## 2025-02-18 NOTE — TELEPHONE ENCOUNTER
If pt is also taking Prasugral (Effient) for his history of coronary stent, then he does not need to take the aspirin if he is going on Xarelto therapy as below.    Dr Sen         02/18/2025 - Patient Calls: Xochitl Lee LPN and You  (Newest Message First)View All Conversations on this Encounter  Xochitl Lee LPN to Me (Selected Message)  RB      2/18/25  2:45 PM  Please advise and route back, thanks     Xochitl Lee LPN  RB    2/18/25  2:37 PM  Note  Pt diagnosed with Acute deep vein thrombosis (DVT) of right peroneal vein (HCC) after knee surgery last week.     Pt was prescribed Xarelto 15 mg PO BID x 21 days then 20 mg PO daily for a total of 3 months      Pt is asking if he should also continue ASA 81mg while on xarelto. Please advise.            ----- Message from [place] sent at 2/18/2025  1:42 PM CST -----  Contact: SUSAN HATHAWAY [[id number]]  ..Type:  Patient Requesting CallWho Called: SUSAN HATHAWAY [[id number]]Does the patient know what this is regarding?: wants to discuss with nurse or provider. Pt having concerns about stopping a certain medication Would the patient rather a call back or a response via [facility]?  Call Best Call Back Number: .[phone] (home) Additional Information:            Orders Placed This Encounter     Active    rivaroxaban (XARELTO) 15 mg (42)- 20 mg (9) tablet dose pack Ordered On: 02/18/2025   rivaroxaban (XARELTO) 20 mg Tab Ordered On: 02/18/2025     Recent Patient Communication     Last Update Description Specialty     Today -- Cardiology     [facility] Sen Open     1 week ago -- Cardiology     [facility] Hernandez Sen Closed     1 week ago -- Urology     [facility] Diez Closed     1 week ago -- Cardiology     [facility] Hernandez Sen Closed     2 weeks ago -- Cardiology     [facility] Hernandez Sen Closed       There are additional recent communications with this patient. View the rest  in Chart Review.

## 2025-03-13 PROBLEM — I82.401 LEG DVT (DEEP VENOUS THROMBOEMBOLISM), ACUTE, RIGHT: Status: ACTIVE | Noted: 2025-03-13

## 2025-03-14 ENCOUNTER — HOSPITAL ENCOUNTER (OUTPATIENT)
Dept: CARDIOLOGY | Facility: HOSPITAL | Age: 75
Discharge: HOME | End: 2025-03-14
Attending: INTERNAL MEDICINE
Payer: MEDICARE

## 2025-03-14 ENCOUNTER — OFFICE VISIT (OUTPATIENT)
Dept: CARDIOLOGY | Facility: CLINIC | Age: 75
End: 2025-03-14
Payer: MEDICARE

## 2025-03-14 VITALS
HEART RATE: 74 BPM | RESPIRATION RATE: 16 BRPM | WEIGHT: 191.38 LBS | OXYGEN SATURATION: 98 % | SYSTOLIC BLOOD PRESSURE: 118 MMHG | DIASTOLIC BLOOD PRESSURE: 68 MMHG | BODY MASS INDEX: 27.4 KG/M2 | HEIGHT: 70 IN

## 2025-03-14 DIAGNOSIS — E78.2 MIXED HYPERLIPIDEMIA: ICD-10-CM

## 2025-03-14 DIAGNOSIS — I20.9 AP (ANGINA PECTORIS): ICD-10-CM

## 2025-03-14 DIAGNOSIS — E66.3 OVERWEIGHT: ICD-10-CM

## 2025-03-14 DIAGNOSIS — Z98.61 HISTORY OF PTCA: ICD-10-CM

## 2025-03-14 DIAGNOSIS — I25.10 CAD, MULTIPLE VESSEL: Primary | ICD-10-CM

## 2025-03-14 DIAGNOSIS — R60.0 EDEMA OF RIGHT LOWER LEG: ICD-10-CM

## 2025-03-14 DIAGNOSIS — I25.118 CORONARY ARTERY DISEASE OF NATIVE ARTERY OF NATIVE HEART WITH STABLE ANGINA PECTORIS: ICD-10-CM

## 2025-03-14 DIAGNOSIS — I10 PRIMARY HYPERTENSION: ICD-10-CM

## 2025-03-14 DIAGNOSIS — I25.2 OLD MI (MYOCARDIAL INFARCTION): ICD-10-CM

## 2025-03-14 DIAGNOSIS — I82.401 LEG DVT (DEEP VENOUS THROMBOEMBOLISM), ACUTE, RIGHT: ICD-10-CM

## 2025-03-14 DIAGNOSIS — Z95.5 STATUS POST INSERTION OF DRUG-ELUTING STENT INTO LEFT ANTERIOR DESCENDING (LAD) ARTERY: ICD-10-CM

## 2025-03-14 LAB
OHS QRS DURATION: 82 MS
OHS QTC CALCULATION: 412 MS

## 2025-03-14 PROCEDURE — 99999 PR PBB SHADOW E&M-EST. PATIENT-LVL II: CPT | Mod: PBBFAC,,, | Performed by: INTERNAL MEDICINE

## 2025-03-14 PROCEDURE — 93010 ELECTROCARDIOGRAM REPORT: CPT | Mod: ,,, | Performed by: INTERNAL MEDICINE

## 2025-03-14 PROCEDURE — 93005 ELECTROCARDIOGRAM TRACING: CPT

## 2025-03-14 NOTE — PROGRESS NOTES
Subjective:    Patient ID:  Harry Phan is a 74 y.o. male who presents for evaluation of Hypertension, Hyperlipidemia, and Coronary Artery Disease          HPI Pt presents for eval.   His current medical conditions include CAD, HTN, dyslipidemia, DVT R LE (Feb 2025 s/p knee surgery).  Nonsmoker.   Past hx pertinent for following:  s/p NSTEMI 12/08 and was found to have a 100% chronic occluded mid-LCX artery and a 95% mid-RCA lesion (culprit vessel) and underwent successful PCI with a 4 x 23 mm Vision BMS.   Medical mgt was advised for his occluded LCX. He also was noted to have nonobstructive LAD disease at the time.  Pt seen 8/18 for recurrent exertional angina and had C next day with PCI performed of critical 95% prox LAD stenosis with Stent Resolute MELE x one,  LCX and patent RCA stent, normal EF.  Echo 9/20 normal LVEF, grade I DD.  Nuclear stress MPI 9/20 normal perfusion, normal EF.   Ecg 1/20/23 NSR, normal ECG.  Nuclear stress test Jan 2024 personally reviewed: no significant ischemia noted, normal EF.  Echo Jan 2024 personally reviewed: normal LV function.  Now here for 6 month f/u appt.  S/p R knee replacement surgery Feb 2025 --- dx with post op R LE DVT.  Chart reviewed.  CVT Vasc Surgery advised 3 months DOAC.  Pain is improved.  Leg edema R LE, states improving.  Ecg today 3/14/25 personally reviewed: NSR, normal ecg.  BP on low side.  CAD is stable.  No active angina.  No dyspnea.  No syncope or dizziness.  Compliant w meds.  Labs reviewed.  Lipids controlled well on statin tx.    Past Medical History:   Diagnosis Date    Acute coronary syndrome     Coronary artery disease     Hyperlipidemia     Hypertension     Old MI (myocardial infarction) 4/17/2014    Status post insertion of drug-eluting stent into left anterior descending (LAD) artery 8/3/2018       Current Outpatient Medications:     atorvastatin (LIPITOR) 80 MG tablet, TAKE 1 TABLET EVERY EVENING, Disp: 90 tablet, Rfl: 3     "isosorbide mononitrate (IMDUR) 30 MG 24 hr tablet, TAKE 1 TABLET EVERY DAY, Disp: 90 tablet, Rfl: 3    metoprolol tartrate (LOPRESSOR) 50 MG tablet, TAKE 1 TABLET TWICE DAILY, Disp: 180 tablet, Rfl: 3    niacin (NIASPAN EXTENDED-RELEASE) 750 MG CR tablet, Take 1 tablet (750 mg total) by mouth 2 (two) times daily., Disp: 180 tablet, Rfl: 3    nitroGLYCERIN (NITROSTAT) 0.4 MG SL tablet, Place 1 tablet (0.4 mg total) under the tongue every 5 (five) minutes as needed., Disp: 20 tablet, Rfl: 12    prasugreL (EFFIENT) 5 mg Tab, TAKE 1 TABLET EVERY DAY, Disp: 90 tablet, Rfl: 3    ramipriL (ALTACE) 5 MG capsule, TAKE 1 CAPSULE EVERY DAY, Disp: 90 capsule, Rfl: 3    rivaroxaban (XARELTO) 15 mg (42)- 20 mg (9) tablet dose pack, Take as instructed on starter pack., Disp: , Rfl:     rivaroxaban (XARELTO) 20 mg Tab, Take 20 mg by mouth., Disp: , Rfl:     tamsulosin (FLOMAX) 0.4 mg Cap, Take 0.4 mg by mouth once daily., Disp: , Rfl:       Review of Systems   Constitutional: Negative.   HENT: Negative.     Eyes: Negative.    Cardiovascular:  Positive for leg swelling.   Respiratory: Negative.     Endocrine: Negative.    Hematologic/Lymphatic: Negative.    Skin: Negative.    Musculoskeletal:  Positive for arthritis, joint pain and stiffness.   Gastrointestinal: Negative.    Genitourinary: Negative.    Neurological: Negative.    Psychiatric/Behavioral: Negative.     Allergic/Immunologic: Negative.           /68 (BP Location: Left arm, Patient Position: Sitting)   Pulse 74   Resp 16   Ht 5' 10" (1.778 m)   Wt 86.8 kg (191 lb 5.8 oz)   SpO2 98%   BMI 27.46 kg/m²       Wt Readings from Last 3 Encounters:   03/14/25 86.8 kg (191 lb 5.8 oz)   09/06/24 89.5 kg (197 lb 5 oz)   07/19/24 88.4 kg (194 lb 14.2 oz)     Temp Readings from Last 3 Encounters:   08/03/18 96.7 °F (35.9 °C) (Oral)     BP Readings from Last 3 Encounters:   03/14/25 118/68   09/06/24 138/78   07/19/24 131/78     Pulse Readings from Last 3 Encounters: "   03/14/25 74   09/06/24 79   07/19/24 79       Objective:    Physical Exam  Vitals and nursing note reviewed.   Constitutional:       Appearance: He is well-developed.   HENT:      Head: Normocephalic.   Neck:      Thyroid: No thyromegaly.      Vascular: Normal carotid pulses. No carotid bruit or JVD.   Cardiovascular:      Rate and Rhythm: Normal rate and regular rhythm.      Pulses:           Radial pulses are 2+ on the right side and 2+ on the left side.      Heart sounds: S1 normal and S2 normal. Heart sounds not distant. No midsystolic click and no opening snap. No murmur heard.     No friction rub. No S3 or S4 sounds.   Pulmonary:      Effort: Pulmonary effort is normal.      Breath sounds: Normal breath sounds. No wheezing or rales.   Abdominal:      General: Bowel sounds are normal. There is no distension or abdominal bruit.      Palpations: Abdomen is soft. There is no mass.      Tenderness: There is no abdominal tenderness.   Musculoskeletal:      Cervical back: Neck supple.      Right lower leg: Edema present.   Skin:     General: Skin is warm.   Neurological:      Mental Status: He is alert and oriented to person, place, and time.   Psychiatric:         Behavior: Behavior normal.       I have reviewed all pertinent labs and cardiac studies.        4 mo ago     CHOLESTEROL 0 - 200 mg/dL 104   LDL CHOLESTEROL mg/dL 51   Comment:    Optimal:                 < 100 mg/dL     Near Optimal:    100 - 129 mg/dL     Borderline High: 130 - 159 mg/dL     High:                 160 - 189 mg/dL     Very High:                >189 mg/dL   HDL CHOLESTEROL 40 - 59 mg/dL 46   TRIGLYCERIDES 0 - 149 mg/dL 50   Comment:    Normal                    <150 mg/dL     Borderline High  150 - 199 mg/dL     High                  200 - 499 mg/dL     Very High                > 500 mg/dL   NON-HDLC mg/dL 58         Results for orders placed during the hospital encounter of 01/19/24    Echo    Interpretation Summary    Left Ventricle:  The left ventricle is normal in size. Normal wall thickness. Normal wall motion. There is normal systolic function with a visually estimated ejection fraction of 60 - 65%. There is normal diastolic function.    Right Ventricle: Normal right ventricular cavity size. Wall thickness is normal. Right ventricle wall motion  is normal. Systolic function is normal.    Tricuspid Valve: There is mild regurgitation.    IVC/SVC: Normal venous pressure at 3 mmHg.      Results for orders placed during the hospital encounter of 01/19/24    Nuclear Stress - Cardiology Interpreted    Interpretation Summary    Normal myocardial perfusion scan. There is no evidence of myocardial ischemia or infarction.    There is a  mild intensity fixed perfusion abnormality in the  wall of the left ventricle secondary to diaphragm attenuation.    The gated perfusion images showed an ejection fraction of 68% at rest. The gated perfusion images showed an ejection fraction of 73% post stress.    The ECG portion of the study is negative for ischemia.    The patient reported no chest pain during the stress test.    There were no arrhythmias during stress.      Assessment:       1. CAD, multiple vessel    2. Leg DVT (deep venous thromboembolism), acute, right    3. AP (angina pectoris)    4. Coronary artery disease of native artery of native heart with stable angina pectoris    5. Old MI (myocardial infarction)    6. Overweight    7. Status post insertion of drug-eluting stent into left anterior descending (LAD) artery    8. Mixed hyperlipidemia    9. Primary hypertension    10. History of PTCA    11. Edema of right lower leg         Plan:             R LE DVT Feb 2025: post op knee surgery. Continue DOAC and f/u Vasc Surgery recs for tx duration.  Discussed mgt w pt in detail.  Continue post op care and PT.  CAD: Stable. No significant ischemia noted on Jan 2024 nuclear stress test.  Continue OMT for CAD.  Continue Effient tx.  Reviewed all tests and  above medical conditions with patient in detail and formulated treatment plan.  Continue optimal medical treatment for cardiovascular conditions.  Cardiac low salt diet advised.  Daily exercise encouraged, with the goal 30 +  minutes aerobic exercise as tolerated.  Maintaining healthy weight and weight loss goals (if needed) were discussed in clinic.  HTN: Need for BP control and HTN goals (if needed) were discussed and tx plan formulated.  Goal < 130/80.  Continue current HTN meds.  Home BP monitoring.  Lipids: Continue statin tx.     F/u in 3 months.    I have reviewed all pertinent labs and cardiac studies independently. Plans and recommendations have been formulated under my direct supervision. All questions answered and patient voiced understanding.

## 2025-03-17 RX ORDER — ISOSORBIDE MONONITRATE 30 MG/1
30 TABLET, EXTENDED RELEASE ORAL
Qty: 90 TABLET | Refills: 3 | Status: SHIPPED | OUTPATIENT
Start: 2025-03-17

## 2025-04-21 ENCOUNTER — LAB VISIT (OUTPATIENT)
Dept: LAB | Facility: HOSPITAL | Age: 75
End: 2025-04-21
Attending: UROLOGY
Payer: MEDICARE

## 2025-04-21 DIAGNOSIS — R97.20 ELEVATED PSA: ICD-10-CM

## 2025-04-21 LAB
PSA FREE MFR SERPL: 34.78 %
PSA FREE SERPL-MCNC: 2.4 NG/ML
PSA SERPL-MCNC: 6.9 NG/ML

## 2025-04-21 PROCEDURE — 36415 COLL VENOUS BLD VENIPUNCTURE: CPT | Mod: PO

## 2025-04-21 PROCEDURE — 84153 ASSAY OF PSA TOTAL: CPT

## 2025-04-25 ENCOUNTER — OFFICE VISIT (OUTPATIENT)
Facility: CLINIC | Age: 75
End: 2025-04-25
Payer: MEDICARE

## 2025-04-25 VITALS
HEIGHT: 70 IN | HEART RATE: 66 BPM | SYSTOLIC BLOOD PRESSURE: 151 MMHG | WEIGHT: 188.81 LBS | RESPIRATION RATE: 16 BRPM | BODY MASS INDEX: 27.03 KG/M2 | DIASTOLIC BLOOD PRESSURE: 86 MMHG

## 2025-04-25 DIAGNOSIS — R31.29 OTHER MICROSCOPIC HEMATURIA: ICD-10-CM

## 2025-04-25 DIAGNOSIS — R97.20 ELEVATED PSA: Primary | ICD-10-CM

## 2025-04-25 DIAGNOSIS — Z80.42 FAMILY HISTORY OF PROSTATE CANCER: ICD-10-CM

## 2025-04-25 DIAGNOSIS — R35.1 NOCTURIA: ICD-10-CM

## 2025-04-25 DIAGNOSIS — N40.0 BPH WITHOUT URINARY OBSTRUCTION: ICD-10-CM

## 2025-04-25 LAB
BILIRUB SERPL-MCNC: NORMAL MG/DL
BLOOD URINE, POC: NORMAL
COLOR, POC UA: YELLOW
GLUCOSE UR QL STRIP: NORMAL
KETONES UR QL STRIP: NORMAL
LEUKOCYTE ESTERASE URINE, POC: NORMAL
NITRITE, POC UA: NORMAL
PH, POC UA: 6
PROTEIN, POC: NORMAL
SPECIFIC GRAVITY, POC UA: 1.03
UROBILINOGEN, POC UA: 0.2

## 2025-04-25 PROCEDURE — 99999 PR PBB SHADOW E&M-EST. PATIENT-LVL IV: CPT | Mod: PBBFAC,,, | Performed by: UROLOGY

## 2025-04-25 NOTE — PROGRESS NOTES
"Subjective:       Patient ID: Harry Phan is a 74 y.o. male.    Chief Complaint: Results      History of Present Illness:     Mr Phan has an elevated PSA.  His PSA was 6.90 on 4-21-25.  He underwent a prostate MRI on 3-16-23 that showed no focal suspicious lesions in the prostate.  BPH.  PIRADS 2.  Prostate volume is 61 cc.  He has a family history of prostate cancer in his uncle.    He has BPH without obstructive LUTS on tamsulosin.  Normal urinary flow.  His nocturia significantly improved with tamsulosin.  Nocturia is currently X 1-2.    He has persistent trace amount of microscopic hematuria.  No gross hematuria.  His UA today on 4-25-25 shows +1 blood. Trace amount of MSH on 10-25-24.  He is not a smoker and he has no smoking history.  He underwent a right knee replacement surgery in February 2025.  He says he developed a right lower extremity DVT.  He is now taking xarelto.         Past Medical History:   Diagnosis Date    Acute coronary syndrome     Coronary artery disease     Hyperlipidemia     Hypertension     Old MI (myocardial infarction) 4/17/2014    Status post insertion of drug-eluting stent into left anterior descending (LAD) artery 8/3/2018     Family History   Problem Relation Name Age of Onset    Heart attack Father  68     Social History[1]  Encounter Medications[2]     Review of Systems   Constitutional:  Negative for chills and fever.   Respiratory:  Negative for shortness of breath.    Cardiovascular:  Negative for chest pain.   Gastrointestinal:  Negative for nausea and vomiting.   Genitourinary:  Negative for difficulty urinating.   Musculoskeletal:  Negative for back pain.   Skin:  Negative for rash.   Neurological:  Negative for dizziness.   Psychiatric/Behavioral:  Negative for agitation.        Objective:     BP (!) 151/86 (BP Location: Left arm, Patient Position: Sitting)   Pulse 66   Resp 16   Ht 5' 10" (1.778 m)   Wt 85.6 kg (188 lb 13.2 oz)   BMI 27.09 kg/m²     Physical " Exam  Constitutional:       Appearance: Normal appearance.   Pulmonary:      Effort: Pulmonary effort is normal.   Abdominal:      Palpations: Abdomen is soft.   Genitourinary:     Comments: 40 gram prostate with no nodules felt.  There is asymmetry of his prostate with more prominence of the right side of his prostate.  Neurological:      Mental Status: He is alert and oriented to person, place, and time.   Psychiatric:         Mood and Affect: Mood normal.         Office Visit on 04/25/2025   Component Date Value Ref Range Status    Color, UA 04/25/2025 Yellow   Final    Spec Grav UA 04/25/2025 1.030   Final    pH, UA 04/25/2025 6.0   Final    WBC, UA 04/25/2025 n   Final    Nitrite, UA 04/25/2025 n   Final    Protein, POC 04/25/2025 n   Final    Glucose, UA 04/25/2025 n   Final    Ketones, UA 04/25/2025 n   Final    Urobilinogen, UA 04/25/2025 0.2   Final    Bilirubin, POC 04/25/2025 n   Final    Blood, UA 04/25/2025 1+   Final        No results found for this or any previous visit (from the past 8760 hours).     Assessment:       1. Elevated PSA    2. Other microscopic hematuria    3. Nocturia    4. BPH without urinary obstruction    5. Family history of prostate cancer        Plan:     Orders Placed This Encounter    PSA, Total and Free    Creatinine, Serum    POCT urinalysis, dipstick or tablet reag        3-16-23  Prostate MRI.  BRG.  See report.  No focal suspicious lesions in the prostate.  BPH.  PIRADS 2.  Prostate volume is 61 cc.  There is no lymphadenopathy.     8-27-18  Prostate MRI.  BRG.  See report.  There is no focal suspicious lesion.  PIRADS 2.  Prostate size is 45.19 cc.     I reviewed the above imaging results.           4-21-25  PSA 6.90.  Free % PSA 34.78.     10-21-24  PSA 3.7     7-22-24  PSA 5.6.  Free % PSA 20.36.     8-3-23  PSA 4.35.  Free % PSA 17.2.    1-5-23  PSA 8.47     9-26-22  5.53    4-16-25  Cr 0.89.  GFR 91.     I reviewed the above lab results.            Assessment:  -  Elevated PSA.  Prostate exam today on 4-25-25 shows a 40 gram prostate with no nodules felt.  There is asymmetry of his prostate with more prominence of the right side of his prostate.  - BPH without obstructive LUTS on tamsulosin.    - Nocturia with significant improvement with tamsulosin.  - Prostate MRI on 3-16-23 showed no focal suspicious lesions in the prostate.  BPH.  PIRADS 2.  Prostate volume is 61 cc.   - Prostate cancer screening.  - Persistent trace amount of microscopic hematuria.  UA today on 4-25-25 shows +1 blood. Trace amount of MSH on 10-25-24.  He is not a smoker and he has no smoking history.  He takes xarelto.  - Family history of prostate cancer in his uncle.  - CAD.  History of a MI in 2008.  He has 2 coronary stents.    - Right knee replacement surgery in February 2025.  He says he developed a right lower extremity DVT.  He is now taking xarelto.     Plan:  - I discussed options with the patient today regarding his elevated PSA including another prostate MRI.  The mutual decision was made to proceed with observation with a repeat PSA in 3 months.  - The mutual decision was made to continue with observation of his microscopic hematuria at this time with a repeat urinalysis at his 3 month appointment.  - Continue tamsulosin 0.4 mg 1 PO qhs that was started on 3-21-23.    - I discussed dietary modifications with him today and I recommended he drink mostly water during the day.  - I recommended he limit his fluid intake at night to small amounts of water and to void just prior to bedtime.   - PSA and creatinine both in 3 months a few days prior to a 3 month office visit.    - RTC in 3 months with a PVR on arrival or sooner as needed.                            [1]   Social History  Socioeconomic History    Marital status:    Tobacco Use    Smoking status: Never    Smokeless tobacco: Never   Substance and Sexual Activity    Alcohol use: Yes    Drug use: No     Social Drivers of Health      Financial Resource Strain: Low Risk  (3/9/2025)    Overall Financial Resource Strain (CARDIA)     Difficulty of Paying Living Expenses: Not hard at all   Food Insecurity: No Food Insecurity (3/9/2025)    Hunger Vital Sign     Worried About Running Out of Food in the Last Year: Never true     Ran Out of Food in the Last Year: Never true   Transportation Needs: No Transportation Needs (3/9/2025)    PRAPARE - Transportation     Lack of Transportation (Medical): No     Lack of Transportation (Non-Medical): No   Physical Activity: Insufficiently Active (3/9/2025)    Exercise Vital Sign     Days of Exercise per Week: 3 days     Minutes of Exercise per Session: 40 min   Stress: No Stress Concern Present (3/9/2025)    [employer] - Occupational Stress Questionnaire     Feeling of Stress : Not at all   Housing Stability: Low Risk  (3/9/2025)    Housing Stability Vital Sign     Unable to Pay for Housing in the Last Year: No     Number of Times Moved in the Last Year: 0     Homeless in the Last Year: No   [2]   Outpatient Encounter Medications as of 4/25/2025   Medication Sig Dispense Refill    atorvastatin (LIPITOR) 80 MG tablet TAKE 1 TABLET EVERY EVENING 90 tablet 3    isosorbide mononitrate (IMDUR) 30 MG 24 hr tablet TAKE 1 TABLET EVERY DAY 90 tablet 3    metoprolol tartrate (LOPRESSOR) 50 MG tablet TAKE 1 TABLET TWICE DAILY 180 tablet 3    niacin (NIASPAN EXTENDED-RELEASE) 750 MG CR tablet Take 1 tablet (750 mg total) by mouth 2 (two) times daily. 180 tablet 3    prasugreL (EFFIENT) 5 mg Tab TAKE 1 TABLET EVERY DAY 90 tablet 3    ramipriL (ALTACE) 5 MG capsule TAKE 1 CAPSULE EVERY DAY 90 capsule 3    rivaroxaban (XARELTO) 15 mg (42)- 20 mg (9) tablet dose pack Take as instructed on starter pack.      rivaroxaban (XARELTO) 20 mg Tab Take 20 mg by mouth.      tamsulosin (FLOMAX) 0.4 mg Cap Take 0.4 mg by mouth once daily.      nitroGLYCERIN (NITROSTAT) 0.4 MG SL tablet Place 1 tablet (0.4 mg  total) under the tongue every 5 (five) minutes as needed. 20 tablet 12     No facility-administered encounter medications on file as of 4/25/2025.

## 2025-06-01 DIAGNOSIS — I10 PRIMARY HYPERTENSION: ICD-10-CM

## 2025-06-02 RX ORDER — RAMIPRIL 5 MG/1
5 CAPSULE ORAL
Qty: 90 CAPSULE | Refills: 3 | Status: SHIPPED | OUTPATIENT
Start: 2025-06-02

## 2025-07-11 ENCOUNTER — OFFICE VISIT (OUTPATIENT)
Dept: CARDIOLOGY | Facility: CLINIC | Age: 75
End: 2025-07-11
Payer: MEDICARE

## 2025-07-11 VITALS
DIASTOLIC BLOOD PRESSURE: 78 MMHG | HEART RATE: 67 BPM | RESPIRATION RATE: 16 BRPM | SYSTOLIC BLOOD PRESSURE: 124 MMHG | OXYGEN SATURATION: 97 % | WEIGHT: 189.5 LBS | BODY MASS INDEX: 27.19 KG/M2

## 2025-07-11 DIAGNOSIS — I25.10 CAD, MULTIPLE VESSEL: ICD-10-CM

## 2025-07-11 DIAGNOSIS — I25.118 CORONARY ARTERY DISEASE OF NATIVE ARTERY OF NATIVE HEART WITH STABLE ANGINA PECTORIS: ICD-10-CM

## 2025-07-11 DIAGNOSIS — E78.2 MIXED HYPERLIPIDEMIA: ICD-10-CM

## 2025-07-11 DIAGNOSIS — I82.401 LEG DVT (DEEP VENOUS THROMBOEMBOLISM), ACUTE, RIGHT: ICD-10-CM

## 2025-07-11 DIAGNOSIS — Z98.61 HISTORY OF PTCA: ICD-10-CM

## 2025-07-11 DIAGNOSIS — I25.2 OLD MI (MYOCARDIAL INFARCTION): ICD-10-CM

## 2025-07-11 DIAGNOSIS — Z95.5 STATUS POST INSERTION OF DRUG-ELUTING STENT INTO LEFT ANTERIOR DESCENDING (LAD) ARTERY: ICD-10-CM

## 2025-07-11 DIAGNOSIS — I10 PRIMARY HYPERTENSION: ICD-10-CM

## 2025-07-11 DIAGNOSIS — E66.3 OVERWEIGHT: ICD-10-CM

## 2025-07-11 DIAGNOSIS — I20.9 AP (ANGINA PECTORIS): Primary | ICD-10-CM

## 2025-07-11 DIAGNOSIS — R60.0 EDEMA OF RIGHT LOWER LEG: ICD-10-CM

## 2025-07-11 PROCEDURE — 99999 PR PBB SHADOW E&M-EST. PATIENT-LVL III: CPT | Mod: PBBFAC,,, | Performed by: INTERNAL MEDICINE

## 2025-07-11 NOTE — PROGRESS NOTES
Subjective:    Patient ID:  Harry Phan is a 74 y.o. male who presents for evaluation of Coronary Artery Disease          HPI Pt presents for eval.   His current medical conditions include CAD, HTN, dyslipidemia, DVT R LE (Feb 2025 s/p knee surgery).  Nonsmoker.   Past hx pertinent for following:  s/p NSTEMI 12/08 and was found to have a 100% chronic occluded mid-LCX artery and a 95% mid-RCA lesion (culprit vessel) and underwent successful PCI with a 4 x 23 mm Vision BMS.   Medical mgt was advised for his occluded LCX. He also was noted to have nonobstructive LAD disease at the time.  Pt seen 8/18 for recurrent exertional angina and had [facility] next day with PCI performed of critical 95% prox LAD stenosis with Stent Resolute MELE x one,  LCX and patent RCA stent, normal EF.  Echo 9/20 normal LVEF, grade I DD.  Nuclear stress MPI 9/20 normal perfusion, normal EF.   Ecg 1/20/23 NSR, normal ECG.  Nuclear stress test Jan 2024 personally reviewed: no significant ischemia noted, normal EF.  Echo Jan 2024 personally reviewed: normal LV function.  S/p R knee replacement surgery Feb 2025 --- dx with post op R LE DVT.  Chart reviewed.  CVT Vasc Surgery advised 3 months DOAC.  Now here.  Now off DOAC, DVT resolved now.  Stable CV status.  Angina stable/controlled.  No active CP sxs.  No CHF sxs.  R LE edema improved.  BP controlled.  Lipids controlled on current lipid meds.  Weight down.  Compliant w meds.  Ecg 3/14/25 personally reviewed: NSR, normal ecg.        Past Medical History:   Diagnosis Date    Acute coronary syndrome     Coronary artery disease     Hyperlipidemia     Hypertension     Old MI (myocardial infarction) 4/17/2014    Status post insertion of drug-eluting stent into left anterior descending (LAD) artery 8/3/2018       Current Outpatient Medications:     atorvastatin (LIPITOR) 80 MG tablet, TAKE 1 TABLET EVERY EVENING, Disp: 90 tablet, Rfl: 3    isosorbide mononitrate (IMDUR) 30 MG 24 hr tablet, TAKE 1  TABLET EVERY DAY, Disp: 90 tablet, Rfl: 3    metoprolol tartrate (LOPRESSOR) 50 MG tablet, TAKE 1 TABLET TWICE DAILY, Disp: 180 tablet, Rfl: 3    niacin (NIASPAN EXTENDED-RELEASE) 750 MG CR tablet, Take 1 tablet (750 mg total) by mouth 2 (two) times daily., Disp: 180 tablet, Rfl: 3    nitroGLYCERIN (NITROSTAT) 0.4 MG SL tablet, Place 1 tablet (0.4 mg total) under the tongue every 5 (five) minutes as needed., Disp: 20 tablet, Rfl: 12    prasugreL (EFFIENT) 5 mg Tab, TAKE 1 TABLET EVERY DAY, Disp: 90 tablet, Rfl: 3    ramipriL (ALTACE) 5 MG capsule, TAKE 1 CAPSULE EVERY DAY, Disp: 90 capsule, Rfl: 3    tamsulosin (FLOMAX) 0.4 mg Cap, Take 0.4 mg by mouth once daily., Disp: , Rfl:       Review of Systems   Constitutional: Positive for weight loss.   HENT: Negative.     Eyes: Negative.    Cardiovascular:  Positive for leg swelling.   Respiratory: Negative.     Endocrine: Negative.    Hematologic/Lymphatic: Negative.    Skin: Negative.    Musculoskeletal:  Positive for arthritis, joint pain and stiffness.   Gastrointestinal: Negative.    Genitourinary: Negative.    Neurological: Negative.    Psychiatric/Behavioral: Negative.     Allergic/Immunologic: Negative.           /78 (BP Location: Left arm, Patient Position: Sitting)   Pulse 67   Resp 16   Wt 86 kg (189 lb 7.8 oz)   SpO2 97%   BMI 27.19 kg/m²       Wt Readings from Last 3 Encounters:   07/11/25 86 kg (189 lb 7.8 oz)   04/25/25 85.6 kg (188 lb 13.2 oz)   03/14/25 86.8 kg (191 lb 5.8 oz)     Temp Readings from Last 3 Encounters:   08/03/18 96.7 °F (35.9 °C) (Oral)     BP Readings from Last 3 Encounters:   07/11/25 124/78   04/25/25 (!) 151/86   03/14/25 118/68     Pulse Readings from Last 3 Encounters:   07/11/25 67   04/25/25 66   03/14/25 74       Objective:    Physical Exam  Vitals and nursing note reviewed.   Constitutional:       Appearance: He is well-developed.   HENT:      Head: Normocephalic.   Neck:      Thyroid: No thyromegaly.      Vascular:  Normal carotid pulses. No carotid bruit or JVD.   Cardiovascular:      Rate and Rhythm: Normal rate and regular rhythm.      Pulses:           Radial pulses are 2+ on the right side and 2+ on the left side.      Heart sounds: S1 normal and S2 normal. Heart sounds not distant. No midsystolic click and no opening snap. No murmur heard.     No friction rub. No S3 or S4 sounds.   Pulmonary:      Effort: Pulmonary effort is normal.      Breath sounds: Normal breath sounds. No wheezing or rales.   Abdominal:      General: Bowel sounds are normal. There is no distension or abdominal bruit.      Palpations: Abdomen is soft. There is no mass.      Tenderness: There is no abdominal tenderness.   Musculoskeletal:      Cervical back: Neck supple.      Right lower leg: Edema present.   Skin:     General: Skin is warm.   Neurological:      Mental Status: He is alert and oriented to person, place, and time.   Psychiatric:         Behavior: Behavior normal.       I have reviewed all pertinent labs and cardiac studies.            Results for orders placed during the hospital encounter of 01/19/24    Echo    Interpretation Summary    Left Ventricle: The left ventricle is normal in size. Normal wall thickness. Normal wall motion. There is normal systolic function with a visually estimated ejection fraction of 60 - 65%. There is normal diastolic function.    Right Ventricle: Normal right ventricular cavity size. Wall thickness is normal. Right ventricle wall motion  is normal. Systolic function is normal.    Tricuspid Valve: There is mild regurgitation.    IVC/SVC: Normal venous pressure at 3 mmHg.      Results for orders placed during the hospital encounter of 01/19/24    Nuclear Stress - Cardiology Interpreted    Interpretation Summary    Normal myocardial perfusion scan. There is no evidence of myocardial ischemia or infarction.    There is a  mild intensity fixed perfusion abnormality in the  wall of the left ventricle secondary to  diaphragm attenuation.    The gated perfusion images showed an ejection fraction of 68% at rest. The gated perfusion images showed an ejection fraction of 73% post stress.    The ECG portion of the study is negative for ischemia.    The patient reported no chest pain during the stress test.    There were no arrhythmias during stress.      Assessment:       1. AP (angina pectoris)    2. CAD, multiple vessel    3. Coronary artery disease of native artery of native heart with stable angina pectoris    4. Edema of right lower leg    5. History of PTCA    6. Primary hypertension    7. Leg DVT (deep venous thromboembolism), acute, right    8. Mixed hyperlipidemia    9. Old MI (myocardial infarction)    10. Overweight    11. Status post insertion of drug-eluting stent into left anterior descending (LAD) artery           Plan:             Stable CV status.  R LE DVT Feb 2025: post op knee surgery, s/p DOAC tx.  CAD: Stable. No significant ischemia noted on Jan 2024 nuclear stress test.  Continue OMT for CAD.  Continue Effient tx.  Reviewed all tests and above medical conditions with patient in detail and formulated treatment plan.  Continue optimal medical treatment for cardiovascular conditions.  Cardiac low salt diet advised.  Daily exercise encouraged, with the goal 30 +  minutes aerobic exercise as tolerated.  Maintaining healthy weight and weight loss goals (if needed) were discussed in clinic.  HTN: Need for BP control and HTN goals (if needed) were discussed and tx plan formulated.  Goal < 130/80.  Continue current HTN meds.  Home BP monitoring.  Lipids: Continue statin tx.     Carotid u/s + DENYS test.    Phone review.    F/u 6 months.    I have reviewed all pertinent labs and cardiac studies independently. Plans and recommendations have been formulated under my direct supervision. All questions answered and patient voiced understanding.

## 2025-07-23 ENCOUNTER — HOSPITAL ENCOUNTER (OUTPATIENT)
Dept: CARDIOLOGY | Facility: HOSPITAL | Age: 75
Discharge: HOME | End: 2025-07-23
Attending: INTERNAL MEDICINE
Payer: MEDICARE

## 2025-07-23 VITALS
WEIGHT: 189 LBS | SYSTOLIC BLOOD PRESSURE: 141 MMHG | HEIGHT: 70 IN | DIASTOLIC BLOOD PRESSURE: 77 MMHG | BODY MASS INDEX: 27.06 KG/M2

## 2025-07-23 VITALS — BODY MASS INDEX: 27.06 KG/M2 | WEIGHT: 189 LBS | HEIGHT: 70 IN

## 2025-07-23 DIAGNOSIS — E78.2 MIXED HYPERLIPIDEMIA: ICD-10-CM

## 2025-07-23 DIAGNOSIS — Z98.61 HISTORY OF PTCA: ICD-10-CM

## 2025-07-23 DIAGNOSIS — I25.2 OLD MI (MYOCARDIAL INFARCTION): ICD-10-CM

## 2025-07-23 DIAGNOSIS — I82.401 LEG DVT (DEEP VENOUS THROMBOEMBOLISM), ACUTE, RIGHT: ICD-10-CM

## 2025-07-23 DIAGNOSIS — I25.10 CAD, MULTIPLE VESSEL: ICD-10-CM

## 2025-07-23 DIAGNOSIS — I25.118 CORONARY ARTERY DISEASE OF NATIVE ARTERY OF NATIVE HEART WITH STABLE ANGINA PECTORIS: ICD-10-CM

## 2025-07-23 DIAGNOSIS — R60.0 EDEMA OF RIGHT LOWER LEG: ICD-10-CM

## 2025-07-23 DIAGNOSIS — I20.9 AP (ANGINA PECTORIS): ICD-10-CM

## 2025-07-23 DIAGNOSIS — I10 PRIMARY HYPERTENSION: ICD-10-CM

## 2025-07-23 LAB
LEFT ABI: 1.14
LEFT ARM BP: 141 MMHG
LEFT ARM DIASTOLIC BLOOD PRESSURE: 77 MMHG
LEFT ARM SYSTOLIC BLOOD PRESSURE: 141 MMHG
LEFT CBA DIAS: 17 CM/S
LEFT CBA SYS: 81 CM/S
LEFT CCA DIST DIAS: 19 CM/S
LEFT CCA DIST SYS: 106 CM/S
LEFT CCA MID DIAS: 16 CM/S
LEFT CCA MID SYS: 111 CM/S
LEFT CCA PROX DIAS: 22 CM/S
LEFT CCA PROX SYS: 172 CM/S
LEFT DORSALIS PEDIS: 161 MMHG
LEFT ECA DIAS: 12 CM/S
LEFT ECA SYS: 94 CM/S
LEFT ICA DIST DIAS: 47 CM/S
LEFT ICA DIST SYS: 170 CM/S
LEFT ICA MID DIAS: 28 CM/S
LEFT ICA MID SYS: 100 CM/S
LEFT ICA PROX DIAS: 19 CM/S
LEFT ICA PROX SYS: 66 CM/S
LEFT POSTERIOR TIBIAL: 155 MMHG
LEFT TBI: 0.94
LEFT TOE PRESSURE: 132 MMHG
LEFT VERTEBRAL DIAS: 17 CM/S
LEFT VERTEBRAL SYS: 84 CM/S
OHS CV CAROTID RIGHT ICA EDV HIGHEST: 26
OHS CV CAROTID ULTRASOUND LEFT ICA/CCA RATIO: 1.6
OHS CV CAROTID ULTRASOUND RIGHT ICA/CCA RATIO: 1.43
OHS CV CPX PATIENT HEIGHT IN: 70
OHS CV CPX PATIENT HEIGHT IN: 70
OHS CV PV CAROTID LEFT HIGHEST CCA: 172
OHS CV PV CAROTID LEFT HIGHEST ICA: 170
OHS CV PV CAROTID RIGHT HIGHEST CCA: 129
OHS CV PV CAROTID RIGHT HIGHEST ICA: 106
OHS CV US CAROTID LEFT HIGHEST EDV: 47
RIGHT ABI: 1.16
RIGHT ARM BP: 134 MMHG
RIGHT ARM DIASTOLIC BLOOD PRESSURE: 70 MMHG
RIGHT ARM SYSTOLIC BLOOD PRESSURE: 134 MMHG
RIGHT CBA DIAS: 19 CM/S
RIGHT CBA SYS: 76 CM/S
RIGHT CCA DIST DIAS: 17 CM/S
RIGHT CCA DIST SYS: 74 CM/S
RIGHT CCA MID DIAS: 19 CM/S
RIGHT CCA MID SYS: 106 CM/S
RIGHT CCA PROX DIAS: 18 CM/S
RIGHT CCA PROX SYS: 129 CM/S
RIGHT DORSALIS PEDIS: 161 MMHG
RIGHT ECA DIAS: 12 CM/S
RIGHT ECA SYS: 73 CM/S
RIGHT ICA DIST DIAS: 26 CM/S
RIGHT ICA DIST SYS: 106 CM/S
RIGHT ICA MID DIAS: 17 CM/S
RIGHT ICA MID SYS: 67 CM/S
RIGHT ICA PROX DIAS: 16 CM/S
RIGHT ICA PROX SYS: 76 CM/S
RIGHT POSTERIOR TIBIAL: 164 MMHG
RIGHT TBI: 1.06
RIGHT TOE PRESSURE: 149 MMHG
RIGHT VERTEBRAL DIAS: 16 CM/S
RIGHT VERTEBRAL SYS: 56 CM/S

## 2025-07-23 PROCEDURE — 93880 EXTRACRANIAL BILAT STUDY: CPT

## 2025-07-23 PROCEDURE — 93880 EXTRACRANIAL BILAT STUDY: CPT | Mod: 26,,, | Performed by: INTERNAL MEDICINE

## 2025-07-23 PROCEDURE — 93922 UPR/L XTREMITY ART 2 LEVELS: CPT

## 2025-07-23 PROCEDURE — 93922 UPR/L XTREMITY ART 2 LEVELS: CPT | Mod: 26,,, | Performed by: INTERNAL MEDICINE

## 2025-07-25 ENCOUNTER — LAB VISIT (OUTPATIENT)
Dept: LAB | Facility: HOSPITAL | Age: 75
End: 2025-07-25
Attending: UROLOGY
Payer: MEDICARE

## 2025-07-25 DIAGNOSIS — R97.20 ELEVATED PSA: ICD-10-CM

## 2025-07-25 LAB
CREAT SERPL-MCNC: 0.8 MG/DL (ref 0.5–1.4)
GFR SERPLBLD CREATININE-BSD FMLA CKD-EPI: >60 ML/MIN/1.73/M2
PSA FREE MFR SERPL: 26.6 %
PSA FREE SERPL-MCNC: 1.08 NG/ML
PSA SERPL-MCNC: 4.06 NG/ML

## 2025-07-25 PROCEDURE — 84153 ASSAY OF PSA TOTAL: CPT

## 2025-07-25 PROCEDURE — 36415 COLL VENOUS BLD VENIPUNCTURE: CPT | Mod: PO

## 2025-07-25 PROCEDURE — 82565 ASSAY OF CREATININE: CPT

## 2025-07-28 ENCOUNTER — OFFICE VISIT (OUTPATIENT)
Facility: CLINIC | Age: 75
End: 2025-07-28
Payer: MEDICARE

## 2025-07-28 VITALS
WEIGHT: 188.94 LBS | RESPIRATION RATE: 16 BRPM | SYSTOLIC BLOOD PRESSURE: 143 MMHG | HEART RATE: 64 BPM | BODY MASS INDEX: 27.11 KG/M2 | DIASTOLIC BLOOD PRESSURE: 78 MMHG

## 2025-07-28 DIAGNOSIS — R31.29 OTHER MICROSCOPIC HEMATURIA: ICD-10-CM

## 2025-07-28 DIAGNOSIS — R97.20 ELEVATED PSA: Primary | ICD-10-CM

## 2025-07-28 DIAGNOSIS — N40.0 BPH WITHOUT URINARY OBSTRUCTION: ICD-10-CM

## 2025-07-28 DIAGNOSIS — Z80.42 FAMILY HISTORY OF PROSTATE CANCER: ICD-10-CM

## 2025-07-28 LAB
MICROSCOPIC COMMENT: NORMAL
RBC #/AREA URNS AUTO: 3 /HPF (ref 0–4)
WBC #/AREA URNS AUTO: 1 /HPF (ref 0–5)

## 2025-07-28 PROCEDURE — 81001 URINALYSIS AUTO W/SCOPE: CPT | Performed by: UROLOGY

## 2025-07-28 PROCEDURE — 3078F DIAST BP <80 MM HG: CPT | Mod: CPTII,S$GLB,, | Performed by: UROLOGY

## 2025-07-28 PROCEDURE — 3077F SYST BP >= 140 MM HG: CPT | Mod: CPTII,S$GLB,, | Performed by: UROLOGY

## 2025-07-28 PROCEDURE — 1126F AMNT PAIN NOTED NONE PRSNT: CPT | Mod: CPTII,S$GLB,, | Performed by: UROLOGY

## 2025-07-28 PROCEDURE — 3008F BODY MASS INDEX DOCD: CPT | Mod: CPTII,S$GLB,, | Performed by: UROLOGY

## 2025-07-28 PROCEDURE — 1159F MED LIST DOCD IN RCRD: CPT | Mod: CPTII,S$GLB,, | Performed by: UROLOGY

## 2025-07-28 PROCEDURE — 3288F FALL RISK ASSESSMENT DOCD: CPT | Mod: CPTII,S$GLB,, | Performed by: UROLOGY

## 2025-07-28 PROCEDURE — 3044F HG A1C LEVEL LT 7.0%: CPT | Mod: CPTII,S$GLB,, | Performed by: UROLOGY

## 2025-07-28 PROCEDURE — 99214 OFFICE O/P EST MOD 30 MIN: CPT | Mod: S$GLB,,, | Performed by: UROLOGY

## 2025-07-28 PROCEDURE — 1101F PT FALLS ASSESS-DOCD LE1/YR: CPT | Mod: CPTII,S$GLB,, | Performed by: UROLOGY

## 2025-07-28 PROCEDURE — 99999 PR PBB SHADOW E&M-EST. PATIENT-LVL III: CPT | Mod: PBBFAC,,, | Performed by: UROLOGY

## 2025-07-28 PROCEDURE — 4010F ACE/ARB THERAPY RXD/TAKEN: CPT | Mod: CPTII,S$GLB,, | Performed by: UROLOGY

## 2025-07-28 RX ORDER — TAMSULOSIN HYDROCHLORIDE 0.4 MG/1
0.4 CAPSULE ORAL NIGHTLY
Qty: 90 CAPSULE | Refills: 1 | Status: SHIPPED | OUTPATIENT
Start: 2025-07-28 | End: 2026-07-28

## 2025-07-28 NOTE — PROGRESS NOTES
Subjective:       Patient ID: Harry Phan is a 74 y.o. male.    Chief Complaint: Follow-up      History of Present Illness:     Mr Phan has an elevated PSA that has decreased with observation.   He has not had a prostate biopsy.  He has a family history of prostate cancer in his uncle.  He underwent a prostate MRI on 3-16-23 that showed no focal suspicious lesions in the prostate.  BPH.  PIRADS 2.  Prostate volume is 61 cc.     He has BPH without obstructive LUTS on tamsulosin.  Normal urinary flow.  Nocturia X 0-1 which has significantly improved with tamsulosin.    He has persistent trace amount of microscopic hematuria.  Trace amount of MH today on 7-28-25.  UA on 4-25-25 showed +1 blood.  Trace amount of MH on 10-25-24.  No gross hematuria.  He is not a smoker and he has no smoking history.  He takes aspirin 81 mg daily.  He says he stopped taking xarelto at the end of May 2025.  He took xarelto for a right lower extremity DVT after his right knee replacement surgery in February 2025.         Past Medical History:   Diagnosis Date    Acute coronary syndrome     Coronary artery disease     Hyperlipidemia     Hypertension     Old MI (myocardial infarction) 4/17/2014    Status post insertion of drug-eluting stent into left anterior descending (LAD) artery 8/3/2018     Family History   Problem Relation Name Age of Onset    Heart attack Father  68     Social History[1]  Encounter Medications[2]     Review of Systems   Constitutional:  Negative for chills and fever.   Respiratory:  Negative for shortness of breath.    Cardiovascular:  Negative for chest pain.   Gastrointestinal:  Negative for nausea and vomiting.   Genitourinary:  Negative for difficulty urinating, dysuria and hematuria.   Musculoskeletal:  Negative for back pain.   Skin:  Negative for rash.   Neurological:  Negative for dizziness.   Psychiatric/Behavioral:  Negative for agitation.        Objective:     BP (!) 143/78 (Patient Position:  Sitting)   Pulse 64   Resp 16   Wt 85.7 kg (188 lb 15 oz)   BMI 27.11 kg/m²     Physical Exam  Constitutional:       Appearance: Normal appearance.   Pulmonary:      Effort: Pulmonary effort is normal.   Abdominal:      Palpations: Abdomen is soft.   Neurological:      Mental Status: He is alert and oriented to person, place, and time.   Psychiatric:         Mood and Affect: Mood normal.         No visits with results within 1 Day(s) from this visit.   Latest known visit with results is:   Lab Visit on 07/25/2025   Component Date Value Ref Range Status    Prostate Specific Antigen 07/25/2025 4.06 (H)  <=4.00 ng/mL Final    PSA Expected levels:  Hormonal therapy: < 0.05 ng/mL   Prostatectomy: < 0.01 ng/mL   Radiation therapy: < 1.00 ng/mL      Prostate Specific Antigen Free 07/25/2025 1.08  <=1.50 ng/mL Final    PSA % Free 07/25/2025 26.60  Not established % Final    Creatinine 07/25/2025 0.8  0.5 - 1.4 mg/dL Final    eGFR 07/25/2025 >60  >60 mL/min/1.73/m2 Final    Estimated GFR calculated using the CKD-EPI creatinine (2021) equation.        No results found for this or any previous visit (from the past 8760 hours).     Assessment:       1. Elevated PSA    2. Other microscopic hematuria    3. BPH without urinary obstruction    4. Family history of prostate cancer        Plan:     Orders Placed This Encounter    PSA, Total and Free    Urinalysis Microscopic    tamsulosin (FLOMAX) 0.4 mg Cap          3-16-23  Prostate MRI.  BRG.  See report.  No focal suspicious lesions in the prostate.  BPH.  PIRADS 2.  Prostate volume is 61 cc.  There is no lymphadenopathy.     8-27-18  Prostate MRI.  BRG.  See report.  There is no focal suspicious lesion.  PIRADS 2.  Prostate size is 45.19 cc.     I reviewed the above imaging results.           7-25-25  PSA 4.06.  Free % PSA 26.60.     4-21-25  PSA 6.90.  Free % PSA 34.78.     10-21-24  PSA 3.7     7-22-24  PSA 5.6.  Free % PSA 20.36.     8-3-23  PSA 4.35.  Free % PSA  17.2.    1-5-23  PSA 8.47     9-26-22  5.53     7-25-25  Cr 0.8.  GFR >60.     I reviewed the above lab results.            Assessment:  - Elevated PSA that has decreased with observation.  Prostate exam on 4-25-25 showed a 40 gram prostate with no nodules felt.  There is asymmetry of his prostate with more prominence of the right side of his prostate.  - Prostate MRI on 3-16-23 showed no focal suspicious lesions in the prostate.  BPH.  PIRADS 2.  Prostate volume is 61 cc.   - BPH without obstructive LUTS on tamsulosin.    - Nocturia X 0-1 which has significantly improved with tamsulosin.  - Persistent trace amount of microscopic hematuria.  Trace amount of MH today on 7-28-25.  UA on 4-25-25 showed +1 blood.  Trace amount of MH on 10-25-24.  He is not a smoker and he has no smoking history.  He takes aspirin 81 mg daily.  He says he stopped taking xarelto at the end of May 2025.  He took xarelto for a right lower extremity DVT after his right knee replacement surgery in February 2025.  - Family history of prostate cancer in his uncle.  - CAD.  History of a MI in 2008.  He has 2 coronary stents.      Plan:  - The mutual decision was made to proceed with observation of his PSA with a repeat PSA in 6 months.  - Urine microscopy today on 7-28-25.  - Refilled tamsulosin 0.4 mg 1 PO qhs that was started on 3-21-23.    - I discussed dietary modifications with him today and I recommended he drink mostly water during the day.  - I recommended he limit his fluid intake at night to small amounts of water and to void just prior to bedtime.   - PSA in 6 months a few days prior to a 6 month office visit.    - RTC in 6 months or sooner as needed.                     [1]   Social History  Socioeconomic History    Marital status:    Tobacco Use    Smoking status: Never    Smokeless tobacco: Never   Substance and Sexual Activity    Alcohol use: Yes    Drug use: No     Social Drivers of Health     Financial Resource Strain:  Low Risk  (3/9/2025)    Overall Financial Resource Strain (CARDIA)     Difficulty of Paying Living Expenses: Not hard at all   Food Insecurity: No Food Insecurity (3/9/2025)    Hunger Vital Sign     Worried About Running Out of Food in the Last Year: Never true     Ran Out of Food in the Last Year: Never true   Transportation Needs: No Transportation Needs (3/9/2025)    PRAPARE - Transportation     Lack of Transportation (Medical): No     Lack of Transportation (Non-Medical): No   Physical Activity: Insufficiently Active (3/9/2025)    Exercise Vital Sign     Days of Exercise per Week: 3 days     Minutes of Exercise per Session: 40 min   Stress: No Stress Concern Present (3/9/2025)    [employer] - Occupational Stress Questionnaire     Feeling of Stress : Not at all   Housing Stability: Low Risk  (3/9/2025)    Housing Stability Vital Sign     Unable to Pay for Housing in the Last Year: No     Number of Times Moved in the Last Year: 0     Homeless in the Last Year: No   [2]   Outpatient Encounter Medications as of 7/28/2025   Medication Sig Dispense Refill    atorvastatin (LIPITOR) 80 MG tablet TAKE 1 TABLET EVERY EVENING 90 tablet 3    isosorbide mononitrate (IMDUR) 30 MG 24 hr tablet TAKE 1 TABLET EVERY DAY 90 tablet 3    metoprolol tartrate (LOPRESSOR) 50 MG tablet TAKE 1 TABLET TWICE DAILY 180 tablet 3    niacin (NIASPAN EXTENDED-RELEASE) 750 MG CR tablet Take 1 tablet (750 mg total) by mouth 2 (two) times daily. 180 tablet 3    prasugreL (EFFIENT) 5 mg Tab TAKE 1 TABLET EVERY DAY 90 tablet 3    ramipriL (ALTACE) 5 MG capsule TAKE 1 CAPSULE EVERY DAY 90 capsule 3    tamsulosin (FLOMAX) 0.4 mg Cap Take 0.4 mg by mouth once daily.      nitroGLYCERIN (NITROSTAT) 0.4 MG SL tablet Place 1 tablet (0.4 mg total) under the tongue every 5 (five) minutes as needed. 20 tablet 12    tamsulosin (FLOMAX) 0.4 mg Cap Take 1 capsule (0.4 mg total) by mouth nightly. 90 capsule 1    [DISCONTINUED]  rivaroxaban (XARELTO) 15 mg (42)- 20 mg (9) tablet dose pack Take as instructed on starter pack.      [DISCONTINUED] rivaroxaban (XARELTO) 20 mg Tab Take 20 mg by mouth.       No facility-administered encounter medications on file as of 7/28/2025.

## 2025-07-29 ENCOUNTER — PATIENT MESSAGE (OUTPATIENT)
Dept: UROLOGY | Facility: CLINIC | Age: 75
End: 2025-07-29
Payer: MEDICARE

## 2025-07-29 DIAGNOSIS — R31.29 PERSISTENT MICROSCOPIC HEMATURIA: Primary | ICD-10-CM

## 2025-08-06 ENCOUNTER — LAB VISIT (OUTPATIENT)
Dept: LAB | Facility: HOSPITAL | Age: 75
End: 2025-08-06
Attending: UROLOGY
Payer: MEDICARE

## 2025-08-06 DIAGNOSIS — R31.29 PERSISTENT MICROSCOPIC HEMATURIA: ICD-10-CM

## 2025-08-06 LAB
BILIRUB UR QL STRIP.AUTO: NEGATIVE
CLARITY UR: CLEAR
COLOR UR AUTO: COLORLESS
GLUCOSE UR QL STRIP: NEGATIVE
HGB UR QL STRIP: NEGATIVE
KETONES UR QL STRIP: NEGATIVE
LEUKOCYTE ESTERASE UR QL STRIP: NEGATIVE
MICROSCOPIC COMMENT: NORMAL
NITRITE UR QL STRIP: NEGATIVE
PH UR STRIP: 7 [PH]
PROT UR QL STRIP: NEGATIVE
RBC #/AREA URNS AUTO: <1 /HPF (ref 0–4)
SP GR UR STRIP: 1
SQUAMOUS #/AREA URNS AUTO: <1 /HPF
UROBILINOGEN UR STRIP-ACNC: NEGATIVE EU/DL
WBC #/AREA URNS AUTO: <1 /HPF (ref 0–5)

## 2025-08-06 PROCEDURE — 81001 URINALYSIS AUTO W/SCOPE: CPT

## 2025-08-07 ENCOUNTER — TELEPHONE (OUTPATIENT)
Dept: UROLOGY | Facility: CLINIC | Age: 75
End: 2025-08-07
Payer: MEDICARE

## 2025-08-07 NOTE — TELEPHONE ENCOUNTER
Called pt and informed him that his urinalysis did not show any microscopic blood; pt verbalize understanding.

## 2025-08-14 DIAGNOSIS — I10 ESSENTIAL HYPERTENSION: ICD-10-CM

## 2025-08-14 DIAGNOSIS — I25.10 CORONARY ARTERY DISEASE: ICD-10-CM

## 2025-08-14 RX ORDER — METOPROLOL TARTRATE 50 MG/1
50 TABLET ORAL 2 TIMES DAILY
Qty: 180 TABLET | Refills: 3 | Status: SHIPPED | OUTPATIENT
Start: 2025-08-14